# Patient Record
Sex: MALE | Race: WHITE | URBAN - METROPOLITAN AREA
[De-identification: names, ages, dates, MRNs, and addresses within clinical notes are randomized per-mention and may not be internally consistent; named-entity substitution may affect disease eponyms.]

---

## 2017-08-17 ENCOUNTER — INPATIENT (INPATIENT)
Facility: HOSPITAL | Age: 82
LOS: 3 days | Discharge: HOME | End: 2017-08-21
Attending: INTERNAL MEDICINE

## 2017-08-17 DIAGNOSIS — I21.4 NON-ST ELEVATION (NSTEMI) MYOCARDIAL INFARCTION: ICD-10-CM

## 2017-08-17 DIAGNOSIS — R00.1 BRADYCARDIA, UNSPECIFIED: ICD-10-CM

## 2017-08-17 DIAGNOSIS — R55 SYNCOPE AND COLLAPSE: ICD-10-CM

## 2017-08-17 DIAGNOSIS — R07.9 CHEST PAIN, UNSPECIFIED: ICD-10-CM

## 2017-08-17 DIAGNOSIS — N17.9 ACUTE KIDNEY FAILURE, UNSPECIFIED: ICD-10-CM

## 2017-08-23 ENCOUNTER — INPATIENT (INPATIENT)
Facility: HOSPITAL | Age: 82
LOS: 7 days | Discharge: NURSING, REHAB OR LONG-TERM CARE | End: 2017-08-31
Attending: INTERNAL MEDICINE

## 2017-08-23 DIAGNOSIS — R53.81 OTHER MALAISE: ICD-10-CM

## 2017-08-23 DIAGNOSIS — E78.5 HYPERLIPIDEMIA, UNSPECIFIED: ICD-10-CM

## 2017-08-23 DIAGNOSIS — R55 SYNCOPE AND COLLAPSE: ICD-10-CM

## 2017-08-23 DIAGNOSIS — N17.9 ACUTE KIDNEY FAILURE, UNSPECIFIED: ICD-10-CM

## 2017-08-23 DIAGNOSIS — I95.1 ORTHOSTATIC HYPOTENSION: ICD-10-CM

## 2017-08-23 DIAGNOSIS — E11.22 TYPE 2 DIABETES MELLITUS WITH DIABETIC CHRONIC KIDNEY DISEASE: ICD-10-CM

## 2017-08-23 DIAGNOSIS — R07.9 CHEST PAIN, UNSPECIFIED: ICD-10-CM

## 2017-08-23 DIAGNOSIS — I25.10 ATHEROSCLEROTIC HEART DISEASE OF NATIVE CORONARY ARTERY WITHOUT ANGINA PECTORIS: ICD-10-CM

## 2017-08-23 DIAGNOSIS — E83.42 HYPOMAGNESEMIA: ICD-10-CM

## 2017-08-23 DIAGNOSIS — E86.0 DEHYDRATION: ICD-10-CM

## 2017-08-23 DIAGNOSIS — I12.9 HYPERTENSIVE CHRONIC KIDNEY DISEASE WITH STAGE 1 THROUGH STAGE 4 CHRONIC KIDNEY DISEASE, OR UNSPECIFIED CHRONIC KIDNEY DISEASE: ICD-10-CM

## 2017-08-23 DIAGNOSIS — I21.4 NON-ST ELEVATION (NSTEMI) MYOCARDIAL INFARCTION: ICD-10-CM

## 2017-08-23 DIAGNOSIS — N18.3 CHRONIC KIDNEY DISEASE, STAGE 3 (MODERATE): ICD-10-CM

## 2017-08-23 DIAGNOSIS — R10.13 EPIGASTRIC PAIN: ICD-10-CM

## 2017-08-23 DIAGNOSIS — Z95.5 PRESENCE OF CORONARY ANGIOPLASTY IMPLANT AND GRAFT: ICD-10-CM

## 2017-08-23 DIAGNOSIS — J06.9 ACUTE UPPER RESPIRATORY INFECTION, UNSPECIFIED: ICD-10-CM

## 2017-08-23 DIAGNOSIS — C90.01 MULTIPLE MYELOMA IN REMISSION: ICD-10-CM

## 2017-08-23 DIAGNOSIS — R00.1 BRADYCARDIA, UNSPECIFIED: ICD-10-CM

## 2017-08-23 DIAGNOSIS — Z79.4 LONG TERM (CURRENT) USE OF INSULIN: ICD-10-CM

## 2017-08-28 DIAGNOSIS — C90.00 MULTIPLE MYELOMA NOT HAVING ACHIEVED REMISSION: ICD-10-CM

## 2017-08-28 DIAGNOSIS — R42 DIZZINESS AND GIDDINESS: ICD-10-CM

## 2017-09-01 ENCOUNTER — OUTPATIENT (OUTPATIENT)
Dept: OUTPATIENT SERVICES | Facility: HOSPITAL | Age: 82
LOS: 1 days | Discharge: HOME | End: 2017-09-01

## 2017-09-01 DIAGNOSIS — R79.9 ABNORMAL FINDING OF BLOOD CHEMISTRY, UNSPECIFIED: ICD-10-CM

## 2017-09-01 DIAGNOSIS — Z51.5 ENCOUNTER FOR PALLIATIVE CARE: ICD-10-CM

## 2017-09-01 DIAGNOSIS — I21.4 NON-ST ELEVATION (NSTEMI) MYOCARDIAL INFARCTION: ICD-10-CM

## 2017-09-01 DIAGNOSIS — R00.1 BRADYCARDIA, UNSPECIFIED: ICD-10-CM

## 2017-09-01 DIAGNOSIS — D64.9 ANEMIA, UNSPECIFIED: ICD-10-CM

## 2017-09-01 DIAGNOSIS — E11.22 TYPE 2 DIABETES MELLITUS WITH DIABETIC CHRONIC KIDNEY DISEASE: ICD-10-CM

## 2017-09-01 DIAGNOSIS — R55 SYNCOPE AND COLLAPSE: ICD-10-CM

## 2017-09-01 DIAGNOSIS — R07.9 CHEST PAIN, UNSPECIFIED: ICD-10-CM

## 2017-09-01 DIAGNOSIS — N17.9 ACUTE KIDNEY FAILURE, UNSPECIFIED: ICD-10-CM

## 2017-09-01 DIAGNOSIS — I25.10 ATHEROSCLEROTIC HEART DISEASE OF NATIVE CORONARY ARTERY WITHOUT ANGINA PECTORIS: ICD-10-CM

## 2017-09-01 DIAGNOSIS — N18.9 CHRONIC KIDNEY DISEASE, UNSPECIFIED: ICD-10-CM

## 2017-09-01 DIAGNOSIS — Z86.73 PERSONAL HISTORY OF TRANSIENT ISCHEMIC ATTACK (TIA), AND CEREBRAL INFARCTION WITHOUT RESIDUAL DEFICITS: ICD-10-CM

## 2017-09-01 DIAGNOSIS — E78.5 HYPERLIPIDEMIA, UNSPECIFIED: ICD-10-CM

## 2017-09-01 DIAGNOSIS — M10.9 GOUT, UNSPECIFIED: ICD-10-CM

## 2017-09-01 DIAGNOSIS — Z79.01 LONG TERM (CURRENT) USE OF ANTICOAGULANTS: ICD-10-CM

## 2017-09-01 DIAGNOSIS — C90.00 MULTIPLE MYELOMA NOT HAVING ACHIEVED REMISSION: ICD-10-CM

## 2017-09-01 DIAGNOSIS — T44.7X5A ADVERSE EFFECT OF BETA-ADRENORECEPTOR ANTAGONISTS, INITIAL ENCOUNTER: ICD-10-CM

## 2017-09-01 DIAGNOSIS — Z79.84 LONG TERM (CURRENT) USE OF ORAL HYPOGLYCEMIC DRUGS: ICD-10-CM

## 2017-09-01 DIAGNOSIS — I12.9 HYPERTENSIVE CHRONIC KIDNEY DISEASE WITH STAGE 1 THROUGH STAGE 4 CHRONIC KIDNEY DISEASE, OR UNSPECIFIED CHRONIC KIDNEY DISEASE: ICD-10-CM

## 2017-09-01 DIAGNOSIS — E21.3 HYPERPARATHYROIDISM, UNSPECIFIED: ICD-10-CM

## 2017-09-05 ENCOUNTER — OUTPATIENT (OUTPATIENT)
Dept: OUTPATIENT SERVICES | Facility: HOSPITAL | Age: 82
LOS: 1 days | Discharge: HOME | End: 2017-09-05

## 2017-09-05 DIAGNOSIS — I21.4 NON-ST ELEVATION (NSTEMI) MYOCARDIAL INFARCTION: ICD-10-CM

## 2017-09-05 DIAGNOSIS — N17.9 ACUTE KIDNEY FAILURE, UNSPECIFIED: ICD-10-CM

## 2017-09-05 DIAGNOSIS — R55 SYNCOPE AND COLLAPSE: ICD-10-CM

## 2017-09-05 DIAGNOSIS — R07.9 CHEST PAIN, UNSPECIFIED: ICD-10-CM

## 2017-09-05 DIAGNOSIS — R79.9 ABNORMAL FINDING OF BLOOD CHEMISTRY, UNSPECIFIED: ICD-10-CM

## 2017-09-05 DIAGNOSIS — R00.1 BRADYCARDIA, UNSPECIFIED: ICD-10-CM

## 2017-09-11 ENCOUNTER — OUTPATIENT (OUTPATIENT)
Dept: OUTPATIENT SERVICES | Facility: HOSPITAL | Age: 82
LOS: 1 days | Discharge: HOME | End: 2017-09-11

## 2017-09-11 DIAGNOSIS — N17.9 ACUTE KIDNEY FAILURE, UNSPECIFIED: ICD-10-CM

## 2017-09-11 DIAGNOSIS — R79.9 ABNORMAL FINDING OF BLOOD CHEMISTRY, UNSPECIFIED: ICD-10-CM

## 2017-09-11 DIAGNOSIS — R07.9 CHEST PAIN, UNSPECIFIED: ICD-10-CM

## 2017-09-11 DIAGNOSIS — R00.1 BRADYCARDIA, UNSPECIFIED: ICD-10-CM

## 2017-09-11 DIAGNOSIS — I21.4 NON-ST ELEVATION (NSTEMI) MYOCARDIAL INFARCTION: ICD-10-CM

## 2017-09-11 DIAGNOSIS — R55 SYNCOPE AND COLLAPSE: ICD-10-CM

## 2017-09-12 ENCOUNTER — INPATIENT (INPATIENT)
Facility: HOSPITAL | Age: 82
LOS: 2 days | Discharge: HOME | End: 2017-09-15
Attending: INTERNAL MEDICINE | Admitting: INTERNAL MEDICINE

## 2017-09-12 DIAGNOSIS — R55 SYNCOPE AND COLLAPSE: ICD-10-CM

## 2017-09-12 DIAGNOSIS — R07.9 CHEST PAIN, UNSPECIFIED: ICD-10-CM

## 2017-09-12 DIAGNOSIS — N17.9 ACUTE KIDNEY FAILURE, UNSPECIFIED: ICD-10-CM

## 2017-09-12 DIAGNOSIS — I21.4 NON-ST ELEVATION (NSTEMI) MYOCARDIAL INFARCTION: ICD-10-CM

## 2017-09-12 DIAGNOSIS — R00.1 BRADYCARDIA, UNSPECIFIED: ICD-10-CM

## 2017-09-14 DIAGNOSIS — Z02.9 ENCOUNTER FOR ADMINISTRATIVE EXAMINATIONS, UNSPECIFIED: ICD-10-CM

## 2017-09-18 DIAGNOSIS — Z95.828 PRESENCE OF OTHER VASCULAR IMPLANTS AND GRAFTS: ICD-10-CM

## 2017-09-18 DIAGNOSIS — I12.9 HYPERTENSIVE CHRONIC KIDNEY DISEASE WITH STAGE 1 THROUGH STAGE 4 CHRONIC KIDNEY DISEASE, OR UNSPECIFIED CHRONIC KIDNEY DISEASE: ICD-10-CM

## 2017-09-18 DIAGNOSIS — M10.061 IDIOPATHIC GOUT, RIGHT KNEE: ICD-10-CM

## 2017-09-18 DIAGNOSIS — C90.00 MULTIPLE MYELOMA NOT HAVING ACHIEVED REMISSION: ICD-10-CM

## 2017-09-18 DIAGNOSIS — Z86.73 PERSONAL HISTORY OF TRANSIENT ISCHEMIC ATTACK (TIA), AND CEREBRAL INFARCTION WITHOUT RESIDUAL DEFICITS: ICD-10-CM

## 2017-09-18 DIAGNOSIS — M10.062 IDIOPATHIC GOUT, LEFT KNEE: ICD-10-CM

## 2017-09-18 DIAGNOSIS — E78.5 HYPERLIPIDEMIA, UNSPECIFIED: ICD-10-CM

## 2017-09-18 DIAGNOSIS — R55 SYNCOPE AND COLLAPSE: ICD-10-CM

## 2017-09-18 DIAGNOSIS — Z79.4 LONG TERM (CURRENT) USE OF INSULIN: ICD-10-CM

## 2017-09-18 DIAGNOSIS — N18.3 CHRONIC KIDNEY DISEASE, STAGE 3 (MODERATE): ICD-10-CM

## 2017-09-18 DIAGNOSIS — R31.29 OTHER MICROSCOPIC HEMATURIA: ICD-10-CM

## 2017-09-18 DIAGNOSIS — E11.22 TYPE 2 DIABETES MELLITUS WITH DIABETIC CHRONIC KIDNEY DISEASE: ICD-10-CM

## 2017-09-18 DIAGNOSIS — I25.10 ATHEROSCLEROTIC HEART DISEASE OF NATIVE CORONARY ARTERY WITHOUT ANGINA PECTORIS: ICD-10-CM

## 2017-09-18 DIAGNOSIS — M17.0 BILATERAL PRIMARY OSTEOARTHRITIS OF KNEE: ICD-10-CM

## 2017-12-15 ENCOUNTER — APPOINTMENT (OUTPATIENT)
Dept: CARDIOLOGY | Facility: CLINIC | Age: 82
End: 2017-12-15

## 2018-01-22 ENCOUNTER — APPOINTMENT (OUTPATIENT)
Dept: CARDIOLOGY | Facility: CLINIC | Age: 83
End: 2018-01-22

## 2018-01-22 VITALS — DIASTOLIC BLOOD PRESSURE: 78 MMHG | SYSTOLIC BLOOD PRESSURE: 120 MMHG | HEART RATE: 76 BPM

## 2018-01-22 VITALS — BODY MASS INDEX: 38.68 KG/M2 | HEIGHT: 60 IN | WEIGHT: 197 LBS

## 2018-01-22 DIAGNOSIS — Z87.891 PERSONAL HISTORY OF NICOTINE DEPENDENCE: ICD-10-CM

## 2018-01-22 DIAGNOSIS — Z86.79 PERSONAL HISTORY OF OTHER DISEASES OF THE CIRCULATORY SYSTEM: ICD-10-CM

## 2018-02-16 ENCOUNTER — OUTPATIENT (OUTPATIENT)
Dept: OUTPATIENT SERVICES | Facility: HOSPITAL | Age: 83
LOS: 1 days | Discharge: HOME | End: 2018-02-16

## 2018-02-16 DIAGNOSIS — Z01.21 ENCOUNTER FOR DENTAL EXAMINATION AND CLEANING WITH ABNORMAL FINDINGS: ICD-10-CM

## 2020-03-02 ENCOUNTER — TRANSCRIPTION ENCOUNTER (OUTPATIENT)
Age: 85
End: 2020-03-02

## 2020-08-17 ENCOUNTER — OUTPATIENT (OUTPATIENT)
Dept: OUTPATIENT SERVICES | Facility: HOSPITAL | Age: 85
LOS: 1 days | Discharge: HOME | End: 2020-08-17

## 2020-08-18 DIAGNOSIS — K08.109 COMPLETE LOSS OF TEETH, UNSPECIFIED CAUSE, UNSPECIFIED CLASS: ICD-10-CM

## 2020-08-25 ENCOUNTER — EMERGENCY (EMERGENCY)
Facility: HOSPITAL | Age: 85
LOS: 0 days | Discharge: HOME | End: 2020-08-25
Attending: EMERGENCY MEDICINE | Admitting: EMERGENCY MEDICINE
Payer: MEDICARE

## 2020-08-25 VITALS
OXYGEN SATURATION: 99 % | DIASTOLIC BLOOD PRESSURE: 88 MMHG | TEMPERATURE: 98 F | HEART RATE: 80 BPM | SYSTOLIC BLOOD PRESSURE: 218 MMHG | RESPIRATION RATE: 18 BRPM

## 2020-08-25 VITALS — SYSTOLIC BLOOD PRESSURE: 138 MMHG | HEART RATE: 63 BPM | DIASTOLIC BLOOD PRESSURE: 63 MMHG

## 2020-08-25 DIAGNOSIS — R04.0 EPISTAXIS: ICD-10-CM

## 2020-08-25 LAB
ALBUMIN SERPL ELPH-MCNC: 4.1 G/DL — SIGNIFICANT CHANGE UP (ref 3.5–5.2)
ALP SERPL-CCNC: 89 U/L — SIGNIFICANT CHANGE UP (ref 30–115)
ALT FLD-CCNC: 29 U/L — SIGNIFICANT CHANGE UP (ref 0–41)
ANION GAP SERPL CALC-SCNC: 9 MMOL/L — SIGNIFICANT CHANGE UP (ref 7–14)
APTT BLD: 24.3 SEC — LOW (ref 27–39.2)
AST SERPL-CCNC: 57 U/L — HIGH (ref 0–41)
BASE EXCESS BLDV CALC-SCNC: 1.8 MMOL/L — SIGNIFICANT CHANGE UP (ref -2–2)
BASOPHILS # BLD AUTO: 0.05 K/UL — SIGNIFICANT CHANGE UP (ref 0–0.2)
BASOPHILS NFR BLD AUTO: 0.8 % — SIGNIFICANT CHANGE UP (ref 0–1)
BILIRUB SERPL-MCNC: 0.5 MG/DL — SIGNIFICANT CHANGE UP (ref 0.2–1.2)
BLD GP AB SCN SERPL QL: SIGNIFICANT CHANGE UP
BUN SERPL-MCNC: 26 MG/DL — HIGH (ref 10–20)
CA-I SERPL-SCNC: 1.13 MMOL/L — SIGNIFICANT CHANGE UP (ref 1.12–1.3)
CALCIUM SERPL-MCNC: 9.4 MG/DL — SIGNIFICANT CHANGE UP (ref 8.5–10.1)
CHLORIDE SERPL-SCNC: 105 MMOL/L — SIGNIFICANT CHANGE UP (ref 98–110)
CO2 SERPL-SCNC: 22 MMOL/L — SIGNIFICANT CHANGE UP (ref 17–32)
CREAT SERPL-MCNC: 1.5 MG/DL — SIGNIFICANT CHANGE UP (ref 0.7–1.5)
EOSINOPHIL # BLD AUTO: 0.44 K/UL — SIGNIFICANT CHANGE UP (ref 0–0.7)
EOSINOPHIL NFR BLD AUTO: 6.9 % — SIGNIFICANT CHANGE UP (ref 0–8)
GAS PNL BLDV: 131 MMOL/L — LOW (ref 136–145)
GAS PNL BLDV: SIGNIFICANT CHANGE UP
GLUCOSE SERPL-MCNC: 206 MG/DL — HIGH (ref 70–99)
HCO3 BLDV-SCNC: 26 MMOL/L — SIGNIFICANT CHANGE UP (ref 22–29)
HCT VFR BLD CALC: 39.8 % — LOW (ref 42–52)
HCT VFR BLDA CALC: 34.5 % — SIGNIFICANT CHANGE UP (ref 34–44)
HGB BLD CALC-MCNC: 11.2 G/DL — LOW (ref 14–18)
HGB BLD-MCNC: 13.4 G/DL — LOW (ref 14–18)
IMM GRANULOCYTES NFR BLD AUTO: 0.2 % — SIGNIFICANT CHANGE UP (ref 0.1–0.3)
INR BLD: 1.02 RATIO — SIGNIFICANT CHANGE UP (ref 0.65–1.3)
LACTATE BLDV-MCNC: 1.5 MMOL/L — SIGNIFICANT CHANGE UP (ref 0.5–1.6)
LDH SERPL L TO P-CCNC: 364 U/L — HIGH (ref 50–242)
LYMPHOCYTES # BLD AUTO: 2.24 K/UL — SIGNIFICANT CHANGE UP (ref 1.2–3.4)
LYMPHOCYTES # BLD AUTO: 35.3 % — SIGNIFICANT CHANGE UP (ref 20.5–51.1)
MCHC RBC-ENTMCNC: 32.4 PG — HIGH (ref 27–31)
MCHC RBC-ENTMCNC: 33.7 G/DL — SIGNIFICANT CHANGE UP (ref 32–37)
MCV RBC AUTO: 96.4 FL — HIGH (ref 80–94)
MONOCYTES # BLD AUTO: 0.46 K/UL — SIGNIFICANT CHANGE UP (ref 0.1–0.6)
MONOCYTES NFR BLD AUTO: 7.3 % — SIGNIFICANT CHANGE UP (ref 1.7–9.3)
NEUTROPHILS # BLD AUTO: 3.14 K/UL — SIGNIFICANT CHANGE UP (ref 1.4–6.5)
NEUTROPHILS NFR BLD AUTO: 49.5 % — SIGNIFICANT CHANGE UP (ref 42.2–75.2)
NRBC # BLD: 0 /100 WBCS — SIGNIFICANT CHANGE UP (ref 0–0)
PCO2 BLDV: 40 MMHG — LOW (ref 41–51)
PH BLDV: 7.42 — SIGNIFICANT CHANGE UP (ref 7.26–7.43)
PLATELET # BLD AUTO: 130 K/UL — SIGNIFICANT CHANGE UP (ref 130–400)
PO2 BLDV: 48 MMHG — HIGH (ref 20–40)
POTASSIUM BLDV-SCNC: 5.7 MMOL/L — HIGH (ref 3.3–5.6)
POTASSIUM SERPL-MCNC: 6.4 MMOL/L — CRITICAL HIGH (ref 3.5–5)
POTASSIUM SERPL-SCNC: 6.4 MMOL/L — CRITICAL HIGH (ref 3.5–5)
PROT SERPL-MCNC: 6.5 G/DL — SIGNIFICANT CHANGE UP (ref 6–8)
PROTHROM AB SERPL-ACNC: 11.7 SEC — SIGNIFICANT CHANGE UP (ref 9.95–12.87)
RBC # BLD: 4.13 M/UL — LOW (ref 4.7–6.1)
RBC # FLD: 12.9 % — SIGNIFICANT CHANGE UP (ref 11.5–14.5)
SAO2 % BLDV: 84 % — SIGNIFICANT CHANGE UP
SODIUM SERPL-SCNC: 136 MMOL/L — SIGNIFICANT CHANGE UP (ref 135–146)
WBC # BLD: 6.34 K/UL — SIGNIFICANT CHANGE UP (ref 4.8–10.8)
WBC # FLD AUTO: 6.34 K/UL — SIGNIFICANT CHANGE UP (ref 4.8–10.8)

## 2020-08-25 PROCEDURE — 99284 EMERGENCY DEPT VISIT MOD MDM: CPT

## 2020-08-25 NOTE — ED PROVIDER NOTE - PROVIDER TOKENS
FREE:[LAST:[YOUR HEMATOLOGIST/ONCOLOGIST],PHONE:[(   )    -],FAX:[(   )    -],ADDRESS:[DR. PEMBERTON]]

## 2020-08-25 NOTE — ED PROVIDER NOTE - NSFOLLOWUPCLINICS_GEN_ALL_ED_FT
[facility]  ENT  [address], [address] [place] [address]  Phone: [phone]  Fax:   Follow Up Time: 1-3 Days

## 2020-08-25 NOTE — ED PROVIDER NOTE - NS ED ROS FT
CONST: No fever, chills or bodyaches  EYES: No pain, redness, drainage or visual changes.  ENT: (+) nosebleed. No ear pain or discharge, nasal discharge or congestion. No sore throat  CARD: No chest pain, palpitations  RESP: No SOB, cough, hemoptysis. No hx of asthma or COPD  GI: No abdominal pain, N/V/D  : No urinary symptoms  MS: No joint pain, back pain or extremity pain/injury  SKIN: No rashes  NEURO: No headache, dizziness, paresthesias or LOC

## 2020-08-25 NOTE — ED PROVIDER NOTE - ATTENDING CONTRIBUTION TO CARE
89 yo M pmh of MM in remission as per 2 years ago, HTN, HLD, previous TIA on plavix presents with epistaxis. States that this morning he bent over and then started to having bleeding from his right nare. Held pressure for a while but was still having bleeding so called ems. Patient denies any dizziness, headache, chest pain, no shortness of breath, no palpitations. oncologist is Dr. Daniel.     CONSTITUTIONAL: Well-developed; well-nourished; in no acute distress.   SKIN: warm, dry  HEAD: Normocephalic; atraumatic.  EYES: PERRL, EOMI, no conjunctival erythema  ENT: + epistaxis, oozing from the right nare. unable to visualize origin of bleeding. no active bleeding noted in posterior pharynx. Protecting airway and secretions.   NECK: Supple; non tender.  CARD: S1, S2 normal;  Regular rate and rhythm.   RESP: No wheezes, rales or rhonchi.  ABD: soft non tender, non distended, no rebound or guarding  EXT: Normal ROM.  5/5 strength in all 4 extremities   LYMPH: No acute cervical adenopathy.  NEURO: Alert, oriented, grossly unremarkable. neurovascularly intact  PSYCH: Cooperative, appropriate.

## 2020-08-25 NOTE — ED PROVIDER NOTE - PROGRESS NOTE DETAILS
close pin placed on nose to apply pressure, will reassess spoke with heme/onc dr. chakraborty, since dr. easton is on vacation. dr. chakraborty suggest control bleeding and order basic blood work including ldh to determine if serum viscosity is elevated which could suggest nose bleed secondary to multiple myeloma reoccurrence. no active nose bleed. spoke with dr. chakraborty regarding elevated ldh, states as long as nose bleed has stopped, can be discharged home and will be worked up out pt. [facility] results [facility] pt, and conversation with him. advised of return precaution such as recurrent nosebleed, bruising, blood in the urine, blood in the stool, dizziness, or syncope. agreeable to dc. I was directly involved in the care of this patient. PA Fellow Fatoumata note/plan reviewed and agreed.

## 2020-08-25 NOTE — ED ADULT NURSE NOTE - OBJECTIVE STATEMENT
BIBEMS for intractable nosebleed starting 10AM today. Daughter stated, "he was gardening and all of a sudden his nose started pouring out". Bleeding still noted on arrival, dressing applied by EMS. Denies falling and head trauma.

## 2020-08-25 NOTE — ED PROVIDER NOTE - OBJECTIVE STATEMENT
89 y/o male with a PMH of multiple myeloma in remission (follows heme/onc Dr. Easton, appt 08/2020), and HTN on aspirin and Plavix presents to the ED for evaluation of nosebleed that began around 9:45AM today after bending forward to remove weeds from his garden. pt daughter reports to applying pressure and ice packs with no cessation of bleeding. pt daughter called dr. easton office. pt denies easy bruising, blood in the urine, blood in the stool, hx of nosebleeds, sob, chest pain, recent fall, or recent trauma. 89 y/o male with a PMH of multiple myeloma in remission (follows heme/onc Dr. Colmenares, appt 08/2020), and HTN on aspirin and Plavix presents to the ED for evaluation of nosebleed that began around 9:45AM today after bending forward to remove weeds from his garden. pt daughter reports to applying pressure and ice packs with no cessation of bleeding. pt daughter called dr. Colmenares office. pt denies easy bruising, blood in the urine, blood in the stool, hx of nosebleeds, sob, chest pain, recent fall, or recent trauma.

## 2020-08-25 NOTE — ED PROVIDER NOTE - PHYSICAL EXAMINATION
Physical Exam    Vital Signs: I have reviewed the initial vital signs.  Constitutional: well-nourished, appears stated age, no acute distress  Eyes: Conjunctiva pink, Sclera clear  ENT: Oropharynx is clear with lesions. uvula midline. no tonsillar erythema, edema, or exudates. no stridor. no pta. no active bleeding into the posterior oropharynx. blood oozing with clot in the right nostril only.   Cardiovascular: S1 and S2, regular rate, regular rhythm, well-perfused extremities, radial pulses equal and 2+ b/l.   Respiratory: unlabored respiratory effort, clear to auscultation bilaterally no wheezing, rales and rhonchi. pt is speaking full sentences. no accessory muscle use.   Gastrointestinal: soft, non-tender, nondistended abdomen, no pulsatile mass, normal bowl sounds, no rebound, no guarding  Integumentary: warm, dry, no rash. no ecchymosis.   Neurologic: awake, alert

## 2020-08-25 NOTE — ED PROVIDER NOTE - CLINICAL SUMMARY MEDICAL DECISION MAKING FREE TEXT BOX
Patient presents with epistaxis for the last few hours. Bleeding controlled in the ED with pressure. Discussed with Dr. Huizar from oncology who recommends blood work to make sure this is not related to MM. labs done. LDH elevated. Dr. Huizar aware who states that patient can be see as an outpatient. Results discussed with patient and family. Understands to follow up with Dr. Huizar. Patient to be discharged from ED. Any available test results were discussed with patient and/or family. Verbal instructions given, including instructions to return to ED immediately for any new, worsening, or concerning symptoms. Patient endorsed understanding. Written discharge instructions additionally given, including follow-up plan.

## 2020-08-25 NOTE — ED PROVIDER NOTE - NSFOLLOWUPINSTRUCTIONS_ED_ALL_ED_FT
Nosebleed, Adult  A nosebleed is when blood comes out of the nose. Nosebleeds are common. Usually, they are not a sign of a serious condition.    Nosebleeds can happen if a small blood vessel in your nose starts to bleed or if the lining of your nose (mucous membrane) cracks. They are commonly caused by:    Allergies.  Colds.  Picking your nose.  Blowing your nose too hard.  An injury from sticking an object into your nose or getting hit in the nose.  Dry or cold air.    Less common causes of nosebleeds include:    Toxic fumes.  Something abnormal in the nose or in the air-filled spaces in the bones of the face (sinuses).  Growths in the nose, such as polyps.  Medicines or conditions that cause blood to clot slowly.  Certain illnesses or procedures that irritate or dry out the nasal passages.    Follow these instructions at home:  When you have a nosebleed:     Sit down and tilt your head slightly forward.  Use a clean towel or tissue to pinch your nostrils under the bony part of your nose. After 10 minutes, let go of your nose and see if bleeding starts again. Do not release pressure before that time. If there is still bleeding, repeat the pinching and holding for 10 minutes until the bleeding stops.  Do not place tissues or gauze in the nose to stop bleeding.  Avoid lying down and avoid tilting your head backward. That may make blood collect in the throat and cause gagging or coughing.  Use a nasal spray decongestant to help with a nosebleed as told by your health care provider.  Image Do not use petroleum jelly or mineral oil in your nose. It can drip into your lungs.  After a nosebleed:     Avoid blowing your nose or sniffing for a number of hours.  Avoid straining, lifting, or bending at the waist for several days. You may resume other normal activities as you are able.  Use saline spray or a humidifier as told by your health care provider.  Aspirin and blood thinners make bleeding more likely. If you are prescribed these medicines and you suffer from nosebleeds:    Ask your health care provider if you should stop taking the medicines or if you should adjust the dose.  Do not stop taking medicines that your health care provider has recommended unless told by your health care provider.    If your nosebleed was caused by dry mucous membranes, use over-the-counter saline nasal spray or gel. This will keep the mucous membranes moist and allow them to heal. If you must use a lubricant:    Choose one that is water-soluble.  Use only as much as you need and use it only as often as needed.  Do not lie down until several hours after you use it.    Contact a health care provider if:  You have a fever.  You get nosebleeds often or more often than usual.  You bruise very easily.  You have a nosebleed from having something stuck in your nose.  You have bleeding in your mouth.  You vomit or cough up brown material.  You have a nosebleed after you start a new medicine.  Get help right away if:  You have a nosebleed after a fall or a head injury.  Your nosebleed does not go away after 20 minutes.  You feel dizzy or weak.  You have unusual bleeding from other parts of your body.  You have unusual bruising on other parts of your body.  You become sweaty.  You vomit blood.

## 2020-08-25 NOTE — ED PROVIDER NOTE - PATIENT PORTAL LINK FT
You can access the FollowMyHealth Patient Portal offered by [facility] by registering at the following website: [email or web address]. By joining [employer]’s FollowMyHealth portal, you will also be able to view your health information using other applications (apps) compatible with our system.

## 2020-08-25 NOTE — ED ADULT NURSE NOTE - NSIMPLEMENTINTERV_GEN_ALL_ED
Implemented All Fall with Harm Risk Interventions:  Isabella to call system. Call bell, personal items and telephone within reach. Instruct patient to call for assistance. Room bathroom lighting operational. Non-slip footwear when patient is off stretcher. Physically safe environment: no spills, clutter or unnecessary equipment. Stretcher in lowest position, wheels locked, appropriate side rails in place. Provide visual cue, wrist band, yellow gown, etc. Monitor gait and stability. Monitor for mental status changes and reorient to person, place, and time. Review medications for side effects contributing to fall risk. Reinforce activity limits and safety measures with patient and family. Provide visual clues: red socks.

## 2020-09-17 ENCOUNTER — APPOINTMENT (OUTPATIENT)
Dept: CARDIOLOGY | Facility: CLINIC | Age: 85
End: 2020-09-17
Payer: MEDICARE

## 2020-09-17 VITALS
BODY MASS INDEX: 29.89 KG/M2 | HEIGHT: 66 IN | TEMPERATURE: 96.3 F | HEART RATE: 64 BPM | WEIGHT: 186 LBS | SYSTOLIC BLOOD PRESSURE: 118 MMHG | DIASTOLIC BLOOD PRESSURE: 72 MMHG | OXYGEN SATURATION: 97 % | RESPIRATION RATE: 16 BRPM

## 2020-09-17 VITALS — HEART RATE: 76 BPM | DIASTOLIC BLOOD PRESSURE: 58 MMHG | SYSTOLIC BLOOD PRESSURE: 118 MMHG

## 2020-09-17 VITALS — DIASTOLIC BLOOD PRESSURE: 56 MMHG | SYSTOLIC BLOOD PRESSURE: 114 MMHG

## 2020-09-17 DIAGNOSIS — M10.9 GOUT, UNSPECIFIED: ICD-10-CM

## 2020-09-17 DIAGNOSIS — Z82.3 FAMILY HISTORY OF STROKE: ICD-10-CM

## 2020-09-17 DIAGNOSIS — Z82.0 FAMILY HISTORY OF EPILEPSY AND OTHER DISEASES OF THE NERVOUS SYSTEM: ICD-10-CM

## 2020-09-17 PROCEDURE — 99204 OFFICE O/P NEW MOD 45 MIN: CPT

## 2020-09-17 PROCEDURE — 93000 ELECTROCARDIOGRAM COMPLETE: CPT

## 2020-09-17 RX ORDER — TRAMADOL HYDROCHLORIDE 50 MG/1
50 TABLET, COATED ORAL
Refills: 0 | Status: DISCONTINUED | COMMUNITY
End: 2020-09-17

## 2020-09-17 RX ORDER — CHOLECALCIFEROL (VITAMIN D3) 1250 MCG
1.25 MG CAPSULE ORAL
Refills: 0 | Status: DISCONTINUED | COMMUNITY
End: 2020-09-17

## 2020-09-17 NOTE — ASSESSMENT
[FreeTextEntry1] : HTN - controlled\par High cholesterol - controlled\par DM - stable\par TIAs - stable\par MI - stable\par Dizziness - stable\par Multiple Myeloma - stable\par Left carotid endarterectomy - 2010 - stable\par Gout - stable\par

## 2020-09-17 NOTE — PHYSICAL EXAM
[General Appearance - Well Developed] : well developed [Normal Appearance] : normal appearance [Well Groomed] : well groomed [General Appearance - Well Nourished] : well nourished [No Deformities] : no deformities [General Appearance - In No Acute Distress] : no acute distress [Normal Conjunctiva] : the conjunctiva exhibited no abnormalities [Eyelids - No Xanthelasma] : the eyelids demonstrated no xanthelasmas [Normal Oral Mucosa] : normal oral mucosa [No Oral Pallor] : no oral pallor [No Oral Cyanosis] : no oral cyanosis [Normal Jugular Venous A Waves Present] : normal jugular venous A waves present [Normal Jugular Venous V Waves Present] : normal jugular venous V waves present [No Jugular Venous Saavedra A Waves] : no jugular venous saavedra A waves [5th Left ICS - MCL] : palpated at the 5th LICS in the midclavicular line [Normal Rate] : normal [Normal S1] : normal S1 [Normal S2] : normal S2 [No Murmur] : no murmurs heard [2+] : left 2+ [No Abnormalities] : the abdominal aorta was not enlarged and no bruit was heard [No Pitting Edema] : no pitting edema present [Respiration, Rhythm And Depth] : normal respiratory rhythm and effort [Exaggerated Use Of Accessory Muscles For Inspiration] : no accessory muscle use [Auscultation Breath Sounds / Voice Sounds] : lungs were clear to auscultation bilaterally [Abdomen Soft] : soft [Abdomen Tenderness] : non-tender [Abdomen Mass (___ Cm)] : no abdominal mass palpated [Abnormal Walk] : normal gait [Gait - Sufficient For Exercise Testing] : the gait was sufficient for exercise testing [Nail Clubbing] : no clubbing of the fingernails [Cyanosis, Localized] : no localized cyanosis [Petechial Hemorrhages (___cm)] : no petechial hemorrhages [Skin Color & Pigmentation] : normal skin color and pigmentation [] : no rash [No Venous Stasis] : no venous stasis [Skin Lesions] : no skin lesions [No Skin Ulcers] : no skin ulcer [No Xanthoma] : no  xanthoma was observed [Oriented To Time, Place, And Person] : oriented to person, place, and time [Affect] : the affect was normal [Mood] : the mood was normal [No Anxiety] : not feeling anxious [FreeTextEntry1] : Mallampati score  3 [S3] : no S3 [S4] : no S4 [Right Carotid Bruit] : no bruit heard over the right carotid [Left Carotid Bruit] : no bruit heard over the left carotid [Right Femoral Bruit] : no bruit heard over the right femoral artery [Left Femoral Bruit] : no bruit heard over the left femoral artery

## 2020-09-17 NOTE — HISTORY OF PRESENT ILLNESS
[FreeTextEntry1] : 89 year old male came in for evaluation of HTN, high cholesterol and dizziness.  He has been intermittently dizzy for a few months.  The patient describes the room spinning counter clockwise.  He had a bleeding nose with a BP of 228 a few weeks ago.  He has had high BP for 30 years.  He has high cholesterol for 30 years.  He has DM.  The patient denies chest pains, shortness of breath, palpitations, loss of consciousness, or swelling of the ankles.  He had a MI 7 years ago.   He had a temp PPM 3 years ago and had bradycardia due to medication.  He has multiple myeloma.  He has had TIAS over the last several years.\par \par The patient smoked 1 ppd for 16 years and quit 53 years ago.  He never vaped.  The patient denies substance abuse.  The patient sometimes snore.  He has no difficulty sleeping.  He does not doze off during the day.\par \par PMHX:\par \par HTN - controlled\par High cholesterol - controlled\par DM - stable\par TIAs - stable\par MI - stable\par Dizziness - stable\par Multiple Myeloma - stable\par Left carotid endarterectomy - 2010 - stable\par Gout - stable\par \par

## 2020-09-17 NOTE — REASON FOR VISIT
[Initial Evaluation] : an initial evaluation of [Carotid Artery Stenosis] : carotid stenosis [Coronary Artery Disease] : coronary artery disease [Dizziness] : dizziness [Hyperlipidemia] : hyperlipidemia [Hypertension] : hypertension

## 2020-09-17 NOTE — DISCUSSION/SUMMARY
[___ Month(s)] : [unfilled] month(s) [With Me] : with me [FreeTextEntry1] : Ekg results were reviewed.\par \par Fasting CBC, BMP, LFTs, Lipid Profile, HgbA1c, CRP, UA, Urine Microalbumin, Mg, Ca, TSH, T3, T4 prior to next visit\par Echocardiogram - dizziness, HTN\par Holter monitor - 14 days - dizziness\par Carotid artery duplex - dizziness, h/o TIA\par Abdominal aortic duplex - HTN\par PVR - h/o DM, HTN, hyperlipidemia\par ENT evaluation\par Neuro evaluation\par Monitor home BP\par f/u 1 month

## 2020-09-21 ENCOUNTER — APPOINTMENT (OUTPATIENT)
Dept: CARDIOLOGY | Facility: CLINIC | Age: 85
End: 2020-09-21
Payer: MEDICARE

## 2020-09-21 PROCEDURE — 0296T: CPT

## 2020-09-22 ENCOUNTER — LABORATORY RESULT (OUTPATIENT)
Age: 85
End: 2020-09-22

## 2020-09-22 LAB
BASOPHILS # BLD AUTO: 0.06 K/UL
BASOPHILS NFR BLD AUTO: 0.7 %
EOSINOPHIL # BLD AUTO: 0.62 K/UL
EOSINOPHIL NFR BLD AUTO: 6.8 %
HCT VFR BLD CALC: 40.6 %
HGB BLD-MCNC: 13.3 G/DL
IMM GRANULOCYTES NFR BLD AUTO: 0.2 %
LYMPHOCYTES # BLD AUTO: 4.23 K/UL
LYMPHOCYTES NFR BLD AUTO: 46.6 %
MAN DIFF?: NORMAL
MCHC RBC-ENTMCNC: 32.2 PG
MCHC RBC-ENTMCNC: 32.8 G/DL
MCV RBC AUTO: 98.3 FL
MONOCYTES # BLD AUTO: 0.65 K/UL
MONOCYTES NFR BLD AUTO: 7.2 %
NEUTROPHILS # BLD AUTO: 3.49 K/UL
NEUTROPHILS NFR BLD AUTO: 38.5 %
PLATELET # BLD AUTO: 160 K/UL
RBC # BLD: 4.13 M/UL
RBC # FLD: 13.3 %
WBC # FLD AUTO: 9.07 K/UL

## 2020-09-23 ENCOUNTER — RECORD ABSTRACTING (OUTPATIENT)
Age: 85
End: 2020-09-23

## 2020-09-23 DIAGNOSIS — I34.0 NONRHEUMATIC MITRAL (VALVE) INSUFFICIENCY: ICD-10-CM

## 2020-09-23 DIAGNOSIS — I36.9 NONRHEUMATIC TRICUSPID VALVE DISORDER, UNSPECIFIED: ICD-10-CM

## 2020-09-23 LAB
ALBUMIN SERPL ELPH-MCNC: 4.2 G/DL
ALP BLD-CCNC: 94 U/L
ALT SERPL-CCNC: 38 U/L
ANION GAP SERPL CALC-SCNC: 14 MMOL/L
AST SERPL-CCNC: 41 U/L
BILIRUB DIRECT SERPL-MCNC: <0.2 MG/DL
BILIRUB INDIRECT SERPL-MCNC: >0.3 MG/DL
BILIRUB SERPL-MCNC: 0.5 MG/DL
BUN SERPL-MCNC: 26 MG/DL
CALCIUM SERPL-MCNC: 10.5 MG/DL
CHLORIDE SERPL-SCNC: 104 MMOL/L
CHOLEST SERPL-MCNC: 115 MG/DL
CHOLEST/HDLC SERPL: 2.2 RATIO
CO2 SERPL-SCNC: 22 MMOL/L
CREAT SERPL-MCNC: 1.5 MG/DL
CREAT SPEC-SCNC: 92 MG/DL
CRP SERPL HS-MCNC: 1.19 MG/L
GLUCOSE SERPL-MCNC: 125 MG/DL
HDLC SERPL-MCNC: 53 MG/DL
LDLC SERPL CALC-MCNC: 49 MG/DL
MAGNESIUM SERPL-MCNC: 1.2 MG/DL
MICROALBUMIN 24H UR DL<=1MG/L-MCNC: 26.1 MG/DL
MICROALBUMIN/CREAT 24H UR-RTO: 285 MG/G
POTASSIUM SERPL-SCNC: 4.7 MMOL/L
PROT SERPL-MCNC: 6.5 G/DL
SODIUM SERPL-SCNC: 140 MMOL/L
T3 SERPL-MCNC: 91 NG/DL
T4 SERPL-MCNC: 5.5 UG/DL
TRIGL SERPL-MCNC: 121 MG/DL
TSH SERPL-ACNC: 5.8 UIU/ML

## 2020-10-07 ENCOUNTER — APPOINTMENT (OUTPATIENT)
Dept: CARDIOLOGY | Facility: CLINIC | Age: 85
End: 2020-10-07
Payer: MEDICARE

## 2020-10-07 PROCEDURE — 93880 EXTRACRANIAL BILAT STUDY: CPT

## 2020-10-13 ENCOUNTER — APPOINTMENT (OUTPATIENT)
Dept: CARDIOLOGY | Facility: CLINIC | Age: 85
End: 2020-10-13

## 2020-10-19 ENCOUNTER — APPOINTMENT (OUTPATIENT)
Dept: CARDIOLOGY | Facility: CLINIC | Age: 85
End: 2020-10-19
Payer: MEDICARE

## 2020-10-19 PROCEDURE — 93306 TTE W/DOPPLER COMPLETE: CPT

## 2020-10-21 ENCOUNTER — APPOINTMENT (OUTPATIENT)
Dept: NEUROLOGY | Facility: CLINIC | Age: 85
End: 2020-10-21
Payer: MEDICARE

## 2020-10-21 ENCOUNTER — APPOINTMENT (OUTPATIENT)
Dept: CARDIOLOGY | Facility: CLINIC | Age: 85
End: 2020-10-21
Payer: MEDICARE

## 2020-10-21 VITALS
TEMPERATURE: 97.7 F | WEIGHT: 186 LBS | SYSTOLIC BLOOD PRESSURE: 150 MMHG | BODY MASS INDEX: 29.89 KG/M2 | DIASTOLIC BLOOD PRESSURE: 68 MMHG | OXYGEN SATURATION: 97 % | HEART RATE: 80 BPM | HEIGHT: 66 IN

## 2020-10-21 PROCEDURE — 93923 UPR/LXTR ART STDY 3+ LVLS: CPT

## 2020-10-21 PROCEDURE — 99204 OFFICE O/P NEW MOD 45 MIN: CPT

## 2020-10-21 NOTE — PHYSICAL EXAM
[FreeTextEntry1] : Mental status: Awake, alert and oriented x3.  Recent and remote memory intact.  Naming, repetition and comprehension intact.  Attention/concentration intact.  No dysarthria, no aphasia.  Fund of knowledge appropriate.  \par Cranial nerves: Pupils equally round and reactive to light, visual fields full, no nystagmus, extraocular muscles intact, V1 through V3 intact bilaterally and symmetric, face symmetric, hearing intact to finger rub, palate elevation symmetric, tongue was midline. positive Marcus-Hallpike test shows nystagmus induced in the right side. \par Motor:  MRC grading 5/5 b/l UE/LE.   strength 5/5.  Normal tone and bulk.  No abnormal movements.  \par Sensation: Intact to light touch, proprioception, and pinprick. \par Coordination: No dysmetria on finger-to-nose and heel-to-shin.  No dysdiadokinesia.\par Reflexes: 2+ in bilateral UE/LE, downgoing toes bilaterally. (-) Vazquez.\par Gait: Narrow and steady. No ataxia.  Romberg negative\par \par

## 2020-10-21 NOTE — REVIEW OF SYSTEMS
[Dizziness] : dizziness [Lightheadedness] : lightheadedness [Vertigo] : vertigo [Ataxia] : ataxia [Negative] : Heme/Lymph

## 2020-10-21 NOTE — ASSESSMENT
[FreeTextEntry1] : KUN GLASER is a 89 year old man is being seen as a new patient for episodes of self limiting room spinning sensation with N/V for the past few months. Exam is notable for positive Marcus Hallpike test in the right side. Symptoms and exam are suggestive of peripheral vertigo and possible BPPV. Brain MRI is pending. \par \par - Vestibular rehab \par - Epley maneuvers \par - Will discuss about medication after MRI \par - RTC in 2 months

## 2020-10-21 NOTE — HISTORY OF PRESENT ILLNESS
[FreeTextEntry1] : KUN GLASER is a 89 year old man is being seen as a new patient for episodes of dizziness and room spinning sensation with nausea and vomiting for he past few months. He has medical history of carotid stenosis s/p left CEA, TIA, CAD and Diabetes. He is accompanied to the clinic with his daughter. He describes the episodes as room spinning sensation and imbalance follows by nausea and vomiting. The episodes last between 15 minutes to few hours and sometimes up to entire day during which he is not able to move or turn his head during the episodes. He has history of left sensorineural hearing loss. He was seen by ENT last week and is scheduled for Brain MRI next week. His recent carotid Doppler showed bilateral <50 percent stenosis with patent vertebral arteries.

## 2020-11-04 ENCOUNTER — APPOINTMENT (OUTPATIENT)
Dept: CARDIOLOGY | Facility: CLINIC | Age: 85
End: 2020-11-04
Payer: MEDICARE

## 2020-11-04 PROCEDURE — 93978 VASCULAR STUDY: CPT

## 2020-11-06 ENCOUNTER — APPOINTMENT (OUTPATIENT)
Dept: CARDIOLOGY | Facility: CLINIC | Age: 85
End: 2020-11-06
Payer: MEDICARE

## 2020-11-06 VITALS
OXYGEN SATURATION: 96 % | BODY MASS INDEX: 30.7 KG/M2 | HEART RATE: 60 BPM | WEIGHT: 191 LBS | RESPIRATION RATE: 16 BRPM | HEIGHT: 66 IN | TEMPERATURE: 98.3 F | SYSTOLIC BLOOD PRESSURE: 150 MMHG | DIASTOLIC BLOOD PRESSURE: 70 MMHG

## 2020-11-06 VITALS — SYSTOLIC BLOOD PRESSURE: 110 MMHG | DIASTOLIC BLOOD PRESSURE: 70 MMHG

## 2020-11-06 PROCEDURE — 99214 OFFICE O/P EST MOD 30 MIN: CPT

## 2020-11-06 PROCEDURE — 93000 ELECTROCARDIOGRAM COMPLETE: CPT

## 2020-11-06 RX ORDER — ASPIRIN 81 MG/1
81 TABLET, COATED ORAL DAILY
Refills: 0 | Status: ACTIVE | COMMUNITY

## 2020-11-06 RX ORDER — MULTIVITAMIN
TABLET ORAL DAILY
Refills: 0 | Status: ACTIVE | COMMUNITY

## 2020-11-06 RX ORDER — INSULIN ASPART 100 [IU]/ML
100 INJECTION, SOLUTION INTRAVENOUS; SUBCUTANEOUS
Refills: 0 | Status: ACTIVE | COMMUNITY

## 2020-11-06 RX ORDER — FAMOTIDINE 40 MG/1
40 TABLET, FILM COATED ORAL DAILY
Refills: 0 | Status: DISCONTINUED | COMMUNITY
End: 2020-11-06

## 2020-11-06 NOTE — PHYSICAL EXAM
[General Appearance - Well Developed] : well developed [Normal Appearance] : normal appearance [Well Groomed] : well groomed [General Appearance - Well Nourished] : well nourished [No Deformities] : no deformities [General Appearance - In No Acute Distress] : no acute distress [Normal Conjunctiva] : the conjunctiva exhibited no abnormalities [Eyelids - No Xanthelasma] : the eyelids demonstrated no xanthelasmas [Normal Oral Mucosa] : normal oral mucosa [No Oral Pallor] : no oral pallor [No Oral Cyanosis] : no oral cyanosis [Normal Jugular Venous A Waves Present] : normal jugular venous A waves present [Normal Jugular Venous V Waves Present] : normal jugular venous V waves present [No Jugular Venous Saavedra A Waves] : no jugular venous saavedra A waves [Respiration, Rhythm And Depth] : normal respiratory rhythm and effort [Exaggerated Use Of Accessory Muscles For Inspiration] : no accessory muscle use [Auscultation Breath Sounds / Voice Sounds] : lungs were clear to auscultation bilaterally [Abdomen Soft] : soft [Abdomen Tenderness] : non-tender [Abdomen Mass (___ Cm)] : no abdominal mass palpated [Abnormal Walk] : normal gait [Gait - Sufficient For Exercise Testing] : the gait was sufficient for exercise testing [Nail Clubbing] : no clubbing of the fingernails [Cyanosis, Localized] : no localized cyanosis [Petechial Hemorrhages (___cm)] : no petechial hemorrhages [Skin Color & Pigmentation] : normal skin color and pigmentation [] : no rash [No Venous Stasis] : no venous stasis [Skin Lesions] : no skin lesions [No Skin Ulcers] : no skin ulcer [No Xanthoma] : no  xanthoma was observed [Oriented To Time, Place, And Person] : oriented to person, place, and time [Affect] : the affect was normal [Mood] : the mood was normal [No Anxiety] : not feeling anxious [5th Left ICS - MCL] : palpated at the 5th LICS in the midclavicular line [Normal Rate] : normal [Normal S1] : normal S1 [Normal S2] : normal S2 [No Murmur] : no murmurs heard [2+] : left 2+ [No Abnormalities] : the abdominal aorta was not enlarged and no bruit was heard [No Pitting Edema] : no pitting edema present [Heart Rate And Rhythm] : heart rate and rhythm were normal [Heart Sounds] : normal S1 and S2 [Murmurs] : no murmurs present [Arterial Pulses Normal] : the arterial pulses were normal [Edema] : no peripheral edema present [S3] : no S3 [S4] : no S4 [Right Carotid Bruit] : no bruit heard over the right carotid [Left Carotid Bruit] : no bruit heard over the left carotid [Right Femoral Bruit] : no bruit heard over the right femoral artery [Left Femoral Bruit] : no bruit heard over the left femoral artery

## 2020-11-06 NOTE — DISCUSSION/SUMMARY
[___ Month(s)] : [unfilled] month(s) [With Me] : with me [FreeTextEntry1] : Ekg results were reviewed.\par Recent lab results were reviewed.\par Echocardiogram results were reviewed.\par Carotid artery duplex results were reviewed.\par Holter monitor results were reviewed.\par Abdominal aortic duplex reviewed\par PVR results were reviewed.\par \par Fasting CBC, BMP, LFTs, Lipid Profile, Mg, Ca prior to next visit\par f/u 3 months\par \par

## 2020-11-06 NOTE — REASON FOR VISIT
[Carotid Artery Stenosis] : carotid stenosis [Coronary Artery Disease] : coronary artery disease [Dizziness] : dizziness [Hyperlipidemia] : hyperlipidemia [Hypertension] : hypertension [Follow-Up - Clinic] : a clinic follow-up of

## 2020-11-06 NOTE — ASSESSMENT
[FreeTextEntry1] : HTN - controlled\par High cholesterol - controlled\par DM - stable\par TIAs - stable\par MI - stable\par Dizziness - stable\par Multiple Myeloma - stable\par Left carotid endarterectomy - 2010 - stable\par Gout - stable

## 2020-11-06 NOTE — HISTORY OF PRESENT ILLNESS
[FreeTextEntry1] : 89 year old male came in for evaluation of HTN, high cholesterol and dizziness.  He has been intermittently dizzy for a few months.  The patient describes the room spinning counter clockwise.  He had a bleeding nose with a BP of 228 a few weeks ago.  He has had high BP for 30 years.  He has high cholesterol for 30 years.  He has DM.  The patient denies chest pains, shortness of breath, palpitations, loss of consciousness, or swelling of the ankles.  He had a MI 7 years ago.   He had a temp PPM 3 years ago and had bradycardia due to medication.  He has multiple myeloma.  He has had TIAS over the last several years.\par \par The patient smoked 1 ppd for 16 years and quit 53 years ago.  He never vaped.  The patient denies substance abuse.  The patient sometimes snore.  He has no difficulty sleeping.  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

## 2020-11-19 ENCOUNTER — LABORATORY RESULT (OUTPATIENT)
Age: 85
End: 2020-11-19

## 2020-11-30 ENCOUNTER — APPOINTMENT (OUTPATIENT)
Dept: CARDIOLOGY | Facility: CLINIC | Age: 85
End: 2020-11-30
Payer: MEDICARE

## 2020-11-30 VITALS
OXYGEN SATURATION: 96 % | TEMPERATURE: 97.3 F | HEART RATE: 60 BPM | HEIGHT: 66 IN | SYSTOLIC BLOOD PRESSURE: 122 MMHG | WEIGHT: 188 LBS | BODY MASS INDEX: 30.22 KG/M2 | RESPIRATION RATE: 16 BRPM | DIASTOLIC BLOOD PRESSURE: 70 MMHG

## 2020-11-30 PROCEDURE — 99214 OFFICE O/P EST MOD 30 MIN: CPT

## 2020-11-30 RX ORDER — CLOPIDOGREL 75 MG/1
75 TABLET, FILM COATED ORAL DAILY
Refills: 0 | Status: DISCONTINUED | COMMUNITY
End: 2020-11-30

## 2020-11-30 RX ORDER — FUROSEMIDE 40 MG/1
40 TABLET ORAL DAILY
Refills: 0 | Status: DISCONTINUED | COMMUNITY
End: 2020-11-30

## 2020-11-30 RX ORDER — FEBUXOSTAT 40 MG/1
40 TABLET ORAL DAILY
Refills: 0 | Status: DISCONTINUED | COMMUNITY
End: 2020-11-30

## 2020-11-30 NOTE — ASSESSMENT
[FreeTextEntry1] : HTN - controlled\par High cholesterol - controlled\par DM - stable\par TIAs - stable\par MI - stable\par Dizziness - stable\par Multiple Myeloma - stable\par Left carotid endarterectomy - 2010 - stable\par Gout - stable\par Low magnesiium - new

## 2020-11-30 NOTE — PHYSICAL EXAM
[General Appearance - Well Developed] : well developed [Normal Appearance] : normal appearance [Well Groomed] : well groomed [General Appearance - Well Nourished] : well nourished [No Deformities] : no deformities [General Appearance - In No Acute Distress] : no acute distress [Normal Conjunctiva] : the conjunctiva exhibited no abnormalities [Eyelids - No Xanthelasma] : the eyelids demonstrated no xanthelasmas [Normal Oral Mucosa] : normal oral mucosa [No Oral Pallor] : no oral pallor [No Oral Cyanosis] : no oral cyanosis [Normal Jugular Venous A Waves Present] : normal jugular venous A waves present [Normal Jugular Venous V Waves Present] : normal jugular venous V waves present [No Jugular Venous Saavedra A Waves] : no jugular venous saavedra A waves [Respiration, Rhythm And Depth] : normal respiratory rhythm and effort [Exaggerated Use Of Accessory Muscles For Inspiration] : no accessory muscle use [Auscultation Breath Sounds / Voice Sounds] : lungs were clear to auscultation bilaterally [Heart Rate And Rhythm] : heart rate and rhythm were normal [Heart Sounds] : normal S1 and S2 [Murmurs] : no murmurs present [Arterial Pulses Normal] : the arterial pulses were normal [Edema] : no peripheral edema present [Abdomen Soft] : soft [Abdomen Tenderness] : non-tender [Abdomen Mass (___ Cm)] : no abdominal mass palpated [Abnormal Walk] : normal gait [Gait - Sufficient For Exercise Testing] : the gait was sufficient for exercise testing [Nail Clubbing] : no clubbing of the fingernails [Cyanosis, Localized] : no localized cyanosis [Petechial Hemorrhages (___cm)] : no petechial hemorrhages [Skin Color & Pigmentation] : normal skin color and pigmentation [] : no rash [No Venous Stasis] : no venous stasis [Skin Lesions] : no skin lesions [No Skin Ulcers] : no skin ulcer [No Xanthoma] : no  xanthoma was observed [Oriented To Time, Place, And Person] : oriented to person, place, and time [Affect] : the affect was normal [Mood] : the mood was normal [No Anxiety] : not feeling anxious [5th Left ICS - MCL] : palpated at the 5th LICS in the midclavicular line [Normal Rate] : normal [Normal S1] : normal S1 [Normal S2] : normal S2 [No Murmur] : no murmurs heard [2+] : left 2+ [No Abnormalities] : the abdominal aorta was not enlarged and no bruit was heard [No Pitting Edema] : no pitting edema present [S3] : no S3 [S4] : no S4 [Right Carotid Bruit] : no bruit heard over the right carotid [Left Carotid Bruit] : no bruit heard over the left carotid [Right Femoral Bruit] : no bruit heard over the right femoral artery [Left Femoral Bruit] : no bruit heard over the left femoral artery

## 2020-11-30 NOTE — DISCUSSION/SUMMARY
[___ Month(s)] : [unfilled] month(s) [With Me] : with me [FreeTextEntry1] : Recent lab results were reviewed.\par \par Stop furosemide due to low Mg\par Stop clopidogrel due to recurrent nose bleeds\par Mg Oxide 500 mg po bid\par f/u nephrology - for CKD and hypomagnesiumemia\par f/u 3 months

## 2020-11-30 NOTE — HISTORY OF PRESENT ILLNESS
[FreeTextEntry1] : 89 year old male came in for evaluation of HTN, high cholesterol and dizziness.  He has been intermittently dizzy for a few months.  The patient describes the room spinning counter clockwise.  He had a bleeding nose with a BP of 228 a few weeks ago.  He has had high BP for 30 years.  He has high cholesterol for 30 years.  He has DM.  The patient denies chest pains, shortness of breath, palpitations, loss of consciousness, or swelling of the ankles.  He had a MI 7 years ago.   He had a temp PPM 3 years ago and had bradycardia due to medication.  He has multiple myeloma.  He has had TIAS over the last several years.\par \par The patient smoked 1 ppd for 16 years and quit 53 years ago.  He never vaped.  The patient denies substance abuse.  The patient sometimes snore.  He has no difficulty sleeping.  He does not doze off during the day.\par \par \par The patient was worked up and found to hav BPPV (Benign paroxysmal positional vertigo).\par \par The patinet on 11/8/2020 had HTN episode with BP of 208 and had a nose bleed.  He was started on Metoprolol EES 50 mg po od.\par \par He has felt better since then.\par \par PMHX:\par \par HTN - controlled\par High cholesterol - controlled\par DM - stable\par TIAs - stable\par MI - stable\par Dizziness - stable\par Multiple Myeloma - stable\par Left carotid endarterectomy - 2010 - stable\par Gout - stable\par Low magnesiium - new\par \par

## 2020-11-30 NOTE — REASON FOR VISIT
[Follow-Up - Clinic] : a clinic follow-up of [Carotid Artery Stenosis] : carotid stenosis [Coronary Artery Disease] : coronary artery disease [Dizziness] : dizziness [Hyperlipidemia] : hyperlipidemia [Hypertension] : hypertension

## 2021-01-08 ENCOUNTER — OUTPATIENT (OUTPATIENT)
Dept: OUTPATIENT SERVICES | Facility: HOSPITAL | Age: 86
LOS: 1 days | Discharge: HOME | End: 2021-01-08

## 2021-01-08 DIAGNOSIS — H90.3 SENSORINEURAL HEARING LOSS, BILATERAL: ICD-10-CM

## 2021-01-11 ENCOUNTER — APPOINTMENT (OUTPATIENT)
Dept: NEUROLOGY | Facility: CLINIC | Age: 86
End: 2021-01-11

## 2021-01-13 ENCOUNTER — APPOINTMENT (OUTPATIENT)
Dept: NEUROLOGY | Facility: CLINIC | Age: 86
End: 2021-01-13
Payer: MEDICARE

## 2021-01-13 VITALS
OXYGEN SATURATION: 97 % | WEIGHT: 210 LBS | BODY MASS INDEX: 33.75 KG/M2 | SYSTOLIC BLOOD PRESSURE: 120 MMHG | HEART RATE: 62 BPM | DIASTOLIC BLOOD PRESSURE: 68 MMHG | TEMPERATURE: 98 F | HEIGHT: 66 IN

## 2021-01-13 PROCEDURE — 99213 OFFICE O/P EST LOW 20 MIN: CPT

## 2021-01-13 RX ORDER — BLOOD SUGAR DIAGNOSTIC
STRIP MISCELLANEOUS
Qty: 300 | Refills: 0 | Status: ACTIVE | COMMUNITY
Start: 2020-08-12

## 2021-01-13 RX ORDER — ISOPROPYL ALCOHOL 0.75 G/1
SWAB TOPICAL
Qty: 500 | Refills: 0 | Status: ACTIVE | COMMUNITY
Start: 2020-10-28

## 2021-01-13 RX ORDER — LANCETS 28 GAUGE
EACH MISCELLANEOUS
Qty: 300 | Refills: 0 | Status: ACTIVE | COMMUNITY
Start: 2020-10-05

## 2021-01-13 RX ORDER — PEN NEEDLE, DIABETIC 29 G X1/2"
31G X 5 MM NEEDLE, DISPOSABLE MISCELLANEOUS
Qty: 100 | Refills: 0 | Status: ACTIVE | COMMUNITY
Start: 2020-10-28

## 2021-01-13 NOTE — HISTORY OF PRESENT ILLNESS
[FreeTextEntry1] : KUN GLASER is a 89 year old man is here as a follow up visit for BPPV. He was first seen on November 2020. He has medical history of carotid stenosis s/p left CEA, TIA, CAD and Diabetes.He has episodes as room spinning sensation and imbalance follows by nausea and vomiting for 15 minutes to few hours and sometimes up to entire day. Brain MRI showed no evidence of acoustic schwannoma or acute infarct with chronic lacunar infarct and moderate cortical volume loss. In the last visit he was referred to do vestibular rehab. He has been doing much better with vestibular rehab and Epley maneuvers. Few weeks ago he has another episode of room spinning sensation when he stopped the training for one week. For the past few weeks he had light headedness. Dr Hu stopped his Lasix but then resumed it due worsening leg edema.

## 2021-01-13 NOTE — REVIEW OF SYSTEMS
[Dizziness] : dizziness [Lightheadedness] : lightheadedness [Vertigo] : vertigo [Lower Ext Edema] : lower extremity edema [Negative] : Gastrointestinal

## 2021-01-13 NOTE — ASSESSMENT
[FreeTextEntry1] : KUN GLASER is a 89 year old man is being seen as a follow up patient for BPPV. Brain MRI showed brain stem lesion or acute infarct. He is doing better with vestibular rehab but had one episode relapse few weeks ago.  He now reports light headedness which could be due to dehydration and carotid stenosis. He was encouraged about proper hydration. He would continue vestibular rehab and I will prescribe Meclizine PRN for acute vertigo. \par \par - cw Vestibular rehab \par - Meclizine 12.5 mg PRN \par - RTC in 6 months. \par

## 2021-01-21 ENCOUNTER — OUTPATIENT (OUTPATIENT)
Dept: OUTPATIENT SERVICES | Facility: HOSPITAL | Age: 86
LOS: 1 days | Discharge: HOME | End: 2021-01-21

## 2021-01-21 DIAGNOSIS — H90.3 SENSORINEURAL HEARING LOSS, BILATERAL: ICD-10-CM

## 2021-03-18 ENCOUNTER — LABORATORY RESULT (OUTPATIENT)
Age: 86
End: 2021-03-18

## 2021-03-19 ENCOUNTER — APPOINTMENT (OUTPATIENT)
Dept: CARDIOLOGY | Facility: CLINIC | Age: 86
End: 2021-03-19
Payer: MEDICARE

## 2021-03-19 VITALS — SYSTOLIC BLOOD PRESSURE: 128 MMHG | DIASTOLIC BLOOD PRESSURE: 60 MMHG

## 2021-03-19 VITALS
BODY MASS INDEX: 29.73 KG/M2 | TEMPERATURE: 97.8 F | OXYGEN SATURATION: 96 % | RESPIRATION RATE: 16 BRPM | DIASTOLIC BLOOD PRESSURE: 70 MMHG | WEIGHT: 185 LBS | HEART RATE: 62 BPM | HEIGHT: 66 IN | SYSTOLIC BLOOD PRESSURE: 130 MMHG

## 2021-03-19 PROCEDURE — 99213 OFFICE O/P EST LOW 20 MIN: CPT

## 2021-03-19 PROCEDURE — 93000 ELECTROCARDIOGRAM COMPLETE: CPT

## 2021-03-19 RX ORDER — FAMOTIDINE 40 MG/1
40 TABLET, FILM COATED ORAL
Refills: 0 | Status: ACTIVE | COMMUNITY

## 2021-03-19 RX ORDER — OMEGA-3/DHA/EPA/FISH OIL 1200 MG
1200 CAPSULE ORAL TWICE DAILY
Refills: 0 | Status: ACTIVE | COMMUNITY

## 2021-03-19 RX ORDER — COLCHICINE 0.6 MG/1
0.6 TABLET, FILM COATED ORAL DAILY
Refills: 0 | Status: ACTIVE | COMMUNITY

## 2021-03-19 RX ORDER — SIMVASTATIN 10 MG/1
10 TABLET, FILM COATED ORAL DAILY
Refills: 0 | Status: ACTIVE | COMMUNITY

## 2021-03-19 RX ORDER — ERGOCALCIFEROL 1.25 MG/1
1.25 MG CAPSULE, LIQUID FILLED ORAL
Refills: 0 | Status: ACTIVE | COMMUNITY

## 2021-03-19 RX ORDER — BACILLUS COAGULANS/INULIN 1B-250 MG
CAPSULE ORAL DAILY
Refills: 0 | Status: ACTIVE | COMMUNITY

## 2021-03-19 RX ORDER — LOSARTAN POTASSIUM 50 MG/1
50 TABLET, FILM COATED ORAL DAILY
Refills: 0 | Status: ACTIVE | COMMUNITY

## 2021-03-19 RX ORDER — CALCITRIOL 0.5 UG/1
0.5 CAPSULE, LIQUID FILLED ORAL DAILY
Refills: 0 | Status: ACTIVE | COMMUNITY

## 2021-03-19 RX ORDER — ALLOPURINOL 100 MG/1
100 TABLET ORAL DAILY
Qty: 90 | Refills: 3 | Status: ACTIVE | COMMUNITY
Start: 2020-09-17

## 2021-03-19 NOTE — HISTORY OF PRESENT ILLNESS
[FreeTextEntry1] : 89 year old male came in for evaluation of HTN, high cholesterol and dizziness.  He has been intermittently dizzy for a few months.  The patient describes the room spinning counter clockwise.  He had a bleeding nose with a BP of 228 a few weeks ago.  He has had high BP for 30 years.  He has high cholesterol for 30 years.  He has DM.  The patient denies chest pains, shortness of breath, palpitations, loss of consciousness, or swelling of the ankles.  He had a MI 7 years ago.   He had a temp PPM 3 years ago and had bradycardia due to medication.  He has multiple myeloma.  He has had TIAS over the last several years.\par \par The patient smoked 1 ppd for 16 years and quit 53 years ago.  He never vaped.  The patient denies substance abuse.  The patient sometimes snore.  He has no difficulty sleeping.  He does not doze off during the day.\par \par \par The patient was worked up and found to hav BPPV (Benign paroxysmal positional vertigo).\par \par The patinet on 11/8/2020 had HTN episode with BP of 208 and had a nose bleed.  He was started on Metoprolol EES 50 mg po od.\par \par He has felt better since then.\par \par The patient has been dizzy lately but it is probably due to his blood sugar being erratic.    The patient just had labs done yesterday.\par \par PMHX:\par \par HTN - controlled\par High cholesterol - controlled\par DM - stable\par TIAs - stable\par MI - stable\par Dizziness - stable\par Multiple Myeloma - stable\par Left carotid endarterectomy - 2010 - stable\par Gout - stable\par Low magnesiium - stable\par \par Medications reviewed and reconciled with patient.  Patient is compliant with taking appropriate medications at appropriate doses at appropriate intervals without missing doses.\par \par

## 2021-03-19 NOTE — ASSESSMENT
[FreeTextEntry1] : HTN - controlled\par High cholesterol - controlled\par DM - stable\par TIAs - stable\par MI - stable\par Dizziness - stable\par Multiple Myeloma - stable\par Left carotid endarterectomy - 2010 - stable\par Gout - stable\par Low magnesiium - stable

## 2021-03-19 NOTE — PHYSICAL EXAM
[General Appearance - Well Developed] : well developed [Normal Appearance] : normal appearance [Well Groomed] : well groomed [General Appearance - Well Nourished] : well nourished [No Deformities] : no deformities [General Appearance - In No Acute Distress] : no acute distress [Normal Conjunctiva] : the conjunctiva exhibited no abnormalities [Eyelids - No Xanthelasma] : the eyelids demonstrated no xanthelasmas [Normal Oral Mucosa] : normal oral mucosa [No Oral Pallor] : no oral pallor [No Oral Cyanosis] : no oral cyanosis [Normal Jugular Venous A Waves Present] : normal jugular venous A waves present [Normal Jugular Venous V Waves Present] : normal jugular venous V waves present [No Jugular Venous Saavedra A Waves] : no jugular venous saavedra A waves [Respiration, Rhythm And Depth] : normal respiratory rhythm and effort [Exaggerated Use Of Accessory Muscles For Inspiration] : no accessory muscle use [Auscultation Breath Sounds / Voice Sounds] : lungs were clear to auscultation bilaterally [Heart Rate And Rhythm] : heart rate and rhythm were normal [Heart Sounds] : normal S1 and S2 [Murmurs] : no murmurs present [Arterial Pulses Normal] : the arterial pulses were normal [Edema] : no peripheral edema present [Abdomen Soft] : soft [Abdomen Tenderness] : non-tender [Abdomen Mass (___ Cm)] : no abdominal mass palpated [Abnormal Walk] : normal gait [Gait - Sufficient For Exercise Testing] : the gait was sufficient for exercise testing [Nail Clubbing] : no clubbing of the fingernails [Cyanosis, Localized] : no localized cyanosis [Petechial Hemorrhages (___cm)] : no petechial hemorrhages [Skin Color & Pigmentation] : normal skin color and pigmentation [] : no rash [No Venous Stasis] : no venous stasis [Skin Lesions] : no skin lesions [No Skin Ulcers] : no skin ulcer [No Xanthoma] : no  xanthoma was observed [Oriented To Time, Place, And Person] : oriented to person, place, and time [Affect] : the affect was normal [Mood] : the mood was normal [No Anxiety] : not feeling anxious

## 2021-03-19 NOTE — DISCUSSION/SUMMARY
[___ Month(s)] : [unfilled] month(s) [With Me] : with me [FreeTextEntry1] : Ekg results were reviewed.\par \par Fasting CBC, BMP, LFTs, Lipid Profile, Mg, Ca prior to next visit\par f/u 3 months\par \par Number/complexity of problems -  Mod - 2 or more stable chronic illnesses\par Amount/complexity of data -history, exam, reviewed old note, ekg reviewed\par Risk of complications - Low\par

## 2021-04-20 ENCOUNTER — LABORATORY RESULT (OUTPATIENT)
Age: 86
End: 2021-04-20

## 2021-06-18 ENCOUNTER — APPOINTMENT (OUTPATIENT)
Dept: CARDIOLOGY | Facility: CLINIC | Age: 86
End: 2021-06-18
Payer: MEDICARE

## 2021-06-18 VITALS
SYSTOLIC BLOOD PRESSURE: 110 MMHG | HEART RATE: 56 BPM | DIASTOLIC BLOOD PRESSURE: 70 MMHG | HEIGHT: 66 IN | TEMPERATURE: 97.2 F | RESPIRATION RATE: 16 BRPM | OXYGEN SATURATION: 97 % | WEIGHT: 180 LBS | BODY MASS INDEX: 28.93 KG/M2

## 2021-06-18 DIAGNOSIS — Z00.00 ENCOUNTER FOR GENERAL ADULT MEDICAL EXAMINATION W/OUT ABNORMAL FINDINGS: ICD-10-CM

## 2021-06-18 PROCEDURE — 99213 OFFICE O/P EST LOW 20 MIN: CPT

## 2021-06-18 PROCEDURE — 93000 ELECTROCARDIOGRAM COMPLETE: CPT

## 2021-06-18 RX ORDER — INSULIN DEGLUDEC INJECTION 100 U/ML
100 INJECTION, SOLUTION SUBCUTANEOUS
Refills: 0 | Status: DISCONTINUED | COMMUNITY
End: 2021-06-18

## 2021-06-18 RX ORDER — LEVOTHYROXINE SODIUM 0.03 MG/1
25 TABLET ORAL DAILY
Refills: 0 | Status: ACTIVE | COMMUNITY

## 2021-06-18 RX ORDER — THIAMINE HCL 100 MG
500 TABLET ORAL DAILY
Refills: 0 | Status: ACTIVE | COMMUNITY

## 2021-06-18 RX ORDER — MECLIZINE HYDROCHLORIDE 12.5 MG/1
12.5 TABLET ORAL 3 TIMES DAILY
Qty: 90 | Refills: 3 | Status: DISCONTINUED | COMMUNITY
Start: 2021-01-13 | End: 2021-06-18

## 2021-06-18 RX ORDER — ASCORBIC ACID 125 MG
500 TABLET,CHEWABLE ORAL TWICE DAILY
Qty: 180 | Refills: 3 | Status: DISCONTINUED | COMMUNITY
Start: 2020-11-30 | End: 2021-06-18

## 2021-06-18 NOTE — DISCUSSION/SUMMARY
[With Me] : with me [___ Month(s)] : in [unfilled] month(s) [FreeTextEntry1] : Ekg results were reviewed.\par Recent lab results were reviewed.\par \par Fasting CBC, BMP, LFTs, Lipid Profile, HgbA1c, CRP, UA, Urine Microalbumin, Mg, Ca, TSH, T3, T4 prior to next visit\par f/u 6 month\par \par \par Number/complexity of problems -  Mod - 2 or more stable chronic illnesses\par Amount/complexity of data -history, exam, reviewed old note, labs reviewed, ekg reviewed\par Risk of complications - Low\par

## 2021-06-18 NOTE — REASON FOR VISIT
[Follow-Up - Clinic] : a clinic follow-up of [Carotid Artery Stenosis] : carotid stenosis [Dizziness] : dizziness [Hypertension] : hypertension [Hyperlipidemia] : hyperlipidemia [Coronary Artery Disease] : coronary artery disease [Carotid, Aortic and Peripheral Vascular Disease] : carotid, aortic and peripheral vascular disease

## 2021-07-19 ENCOUNTER — APPOINTMENT (OUTPATIENT)
Dept: NEUROLOGY | Facility: CLINIC | Age: 86
End: 2021-07-19
Payer: MEDICARE

## 2021-07-22 ENCOUNTER — APPOINTMENT (OUTPATIENT)
Dept: NEUROLOGY | Facility: CLINIC | Age: 86
End: 2021-07-22
Payer: MEDICARE

## 2021-07-22 DIAGNOSIS — H81.10 BENIGN PAROXYSMAL VERTIGO, UNSPECIFIED EAR: ICD-10-CM

## 2021-07-22 PROCEDURE — 99212 OFFICE O/P EST SF 10 MIN: CPT | Mod: 95

## 2021-07-23 PROBLEM — H81.10 BPPV (BENIGN PAROXYSMAL POSITIONAL VERTIGO): Status: ACTIVE | Noted: 2020-10-21

## 2021-07-23 NOTE — HISTORY OF PRESENT ILLNESS
[Other Location: e.g. Home (Enter Location, City,State)___] : at [unfilled] [Family Member] : family member [Verbal consent obtained from patient] : the patient, [unfilled] [Time Spent: ___ minutes] : I have spent [unfilled] minutes with the patient on the telephone [FreeTextEntry3] : Daughter  [FreeTextEntry1] : KUN GLASER is a 89 year old man is being followed via tele health visit for BPPV and dizziness. Visit was done via tele medicine and face to face connection was obtained via [employer]\par \par He has medical history of carotid stenosis s/p left CEA, TIA, CAD and Diabetes.He has episodes as room spinning sensation and imbalance follows by nausea and vomiting for 15 minutes to few hours and sometimes up to entire day. Brain MRI showed no evidence of acoustic schwannoma or acute infarct with chronic lacunar infarct and moderate cortical volume loss. He was referred to do vestibular rehab and  been doing much better with vestibular rehab and Epley maneuvers. For the past few months he had issues with his shoulder and could not do the rehab more than once a week. He has less frequent major episodes but had one episode about 1.5 months ago. Per daughter he has more frequent minor episodes of dizziness and sometimes light headednes \par Still reports more frequent episodes of brief dizziness and light headedness lasting for few minutes to seconds. Sometimes patient takes Meclizine and sleeps for entire day. He uses walker for ambulation and had one near fall episode last week. Patient is now scheduled for vestibular rehab for two cessions a week.

## 2021-07-23 NOTE — PLAN
[FreeTextEntry1] : 89 year old man is being seen as a follow up patient for BPPV. Brain MRI showed brain stem lesion or acute infarct. Patient was doing better with vestibular rehab but could not do more than one cession a week recently due to shoulder pain. He is now schedule for two cessions a week.\par \par - cw Vestibular rehab for two cessions a week \par - Meclizine 12.5 mg PRN \par - RTC in 6 months. \par

## 2021-08-19 ENCOUNTER — RX RENEWAL (OUTPATIENT)
Age: 86
End: 2021-08-19

## 2021-09-27 ENCOUNTER — RESULT REVIEW (OUTPATIENT)
Age: 86
End: 2021-09-27

## 2021-09-27 ENCOUNTER — OUTPATIENT (OUTPATIENT)
Dept: OUTPATIENT SERVICES | Facility: HOSPITAL | Age: 86
LOS: 1 days | Discharge: HOME | End: 2021-09-27
Payer: MEDICARE

## 2021-09-27 VITALS
HEIGHT: 66 IN | SYSTOLIC BLOOD PRESSURE: 143 MMHG | OXYGEN SATURATION: 97 % | RESPIRATION RATE: 15 BRPM | HEART RATE: 89 BPM | DIASTOLIC BLOOD PRESSURE: 66 MMHG | TEMPERATURE: 98 F

## 2021-09-27 DIAGNOSIS — G56.01 CARPAL TUNNEL SYNDROME, RIGHT UPPER LIMB: ICD-10-CM

## 2021-09-27 DIAGNOSIS — Z01.818 ENCOUNTER FOR OTHER PREPROCEDURAL EXAMINATION: ICD-10-CM

## 2021-09-27 DIAGNOSIS — Z98.890 OTHER SPECIFIED POSTPROCEDURAL STATES: Chronic | ICD-10-CM

## 2021-09-27 DIAGNOSIS — Z98.49 CATARACT EXTRACTION STATUS, UNSPECIFIED EYE: Chronic | ICD-10-CM

## 2021-09-27 LAB
BASOPHILS # BLD AUTO: 0.04 K/UL — SIGNIFICANT CHANGE UP (ref 0–0.2)
BASOPHILS NFR BLD AUTO: 0.5 % — SIGNIFICANT CHANGE UP (ref 0–1)
EOSINOPHIL # BLD AUTO: 0.71 K/UL — HIGH (ref 0–0.7)
EOSINOPHIL NFR BLD AUTO: 9.3 % — HIGH (ref 0–8)
HCT VFR BLD CALC: 37.9 % — LOW (ref 42–52)
HGB BLD-MCNC: 12.2 G/DL — LOW (ref 14–18)
IMM GRANULOCYTES NFR BLD AUTO: 0.1 % — SIGNIFICANT CHANGE UP (ref 0.1–0.3)
LYMPHOCYTES # BLD AUTO: 3.01 K/UL — SIGNIFICANT CHANGE UP (ref 1.2–3.4)
LYMPHOCYTES # BLD AUTO: 39.3 % — SIGNIFICANT CHANGE UP (ref 20.5–51.1)
MCHC RBC-ENTMCNC: 30.3 PG — SIGNIFICANT CHANGE UP (ref 27–31)
MCHC RBC-ENTMCNC: 32.2 G/DL — SIGNIFICANT CHANGE UP (ref 32–37)
MCV RBC AUTO: 94.3 FL — HIGH (ref 80–94)
MONOCYTES # BLD AUTO: 0.7 K/UL — HIGH (ref 0.1–0.6)
MONOCYTES NFR BLD AUTO: 9.2 % — SIGNIFICANT CHANGE UP (ref 1.7–9.3)
NEUTROPHILS # BLD AUTO: 3.18 K/UL — SIGNIFICANT CHANGE UP (ref 1.4–6.5)
NEUTROPHILS NFR BLD AUTO: 41.6 % — LOW (ref 42.2–75.2)
NRBC # BLD: 0 /100 WBCS — SIGNIFICANT CHANGE UP (ref 0–0)
PLATELET # BLD AUTO: 134 K/UL — SIGNIFICANT CHANGE UP (ref 130–400)
RBC # BLD: 4.02 M/UL — LOW (ref 4.7–6.1)
RBC # FLD: 13.6 % — SIGNIFICANT CHANGE UP (ref 11.5–14.5)
WBC # BLD: 7.65 K/UL — SIGNIFICANT CHANGE UP (ref 4.8–10.8)
WBC # FLD AUTO: 7.65 K/UL — SIGNIFICANT CHANGE UP (ref 4.8–10.8)

## 2021-09-27 PROCEDURE — 93010 ELECTROCARDIOGRAM REPORT: CPT

## 2021-09-27 PROCEDURE — 71046 X-RAY EXAM CHEST 2 VIEWS: CPT | Mod: 26

## 2021-09-27 RX ORDER — SIMVASTATIN 20 MG/1
1 TABLET, FILM COATED ORAL
Qty: 0 | Refills: 0 | DISCHARGE

## 2021-09-27 RX ORDER — ERGOCALCIFEROL 1.25 MG/1
1 CAPSULE ORAL
Qty: 0 | Refills: 0 | DISCHARGE

## 2021-09-27 RX ORDER — OMEGA-3 ACID ETHYL ESTERS 1 G
1 CAPSULE ORAL
Qty: 0 | Refills: 0 | DISCHARGE

## 2021-09-27 RX ORDER — ASPIRIN/CALCIUM CARB/MAGNESIUM 324 MG
1 TABLET ORAL
Qty: 0 | Refills: 0 | DISCHARGE

## 2021-09-27 RX ORDER — METOPROLOL TARTRATE 50 MG
1 TABLET ORAL
Qty: 0 | Refills: 0 | DISCHARGE

## 2021-09-27 RX ORDER — L.ACIDOPH/B.ANIMALIS/B.LONGUM 15B CELL
1 CAPSULE ORAL
Qty: 0 | Refills: 0 | DISCHARGE

## 2021-09-27 RX ORDER — LEVOTHYROXINE SODIUM 125 MCG
1 TABLET ORAL
Qty: 0 | Refills: 0 | DISCHARGE

## 2021-09-27 RX ORDER — FAMOTIDINE 10 MG/ML
1 INJECTION INTRAVENOUS
Qty: 0 | Refills: 0 | DISCHARGE

## 2021-09-27 RX ORDER — CALCITRIOL 0.5 UG/1
1 CAPSULE ORAL
Qty: 0 | Refills: 0 | DISCHARGE

## 2021-09-27 RX ORDER — INSULIN ASPART 100 [IU]/ML
0 INJECTION, SOLUTION SUBCUTANEOUS
Qty: 0 | Refills: 0 | DISCHARGE

## 2021-09-27 RX ORDER — INSULIN DEGLUDEC 100 U/ML
0 INJECTION, SOLUTION SUBCUTANEOUS
Qty: 0 | Refills: 0 | DISCHARGE

## 2021-09-27 RX ORDER — ASCORBIC ACID 60 MG
1 TABLET,CHEWABLE ORAL
Qty: 0 | Refills: 0 | DISCHARGE

## 2021-09-27 NOTE — H&P PST ADULT - HISTORY OF PRESENT ILLNESS
PT PRESENTS TO PAST WITH NO SOB, CP, PALPITATIONS, DYSURIA, UTI OR URI AT PRESENT.   PT ABLE TO WALK UP  1/2  FLIGHTS OF STEPS WITH NO SOB- very slowly- pt amvulates with a walker.   AS PER THE PT, THIS IS HIS/HER COMPLETE MEDICAL AND SURGICAL HX, INCLUDING MEDICATIONS PRESCRIBED AND OVER THE COUNTER  pt denies any covid s/s, or tested positive in the past- PT AWARE OF DATE AND TIME FOR COVID TESTING PRIOR TO PROCEDURE.   pt advised self quarantine till day of procedure  denies travel outside the [place] in the past 30 days  Anesthesia Alert  NO--Difficult Airway  YES  --History of neck surgery or radiation  NO--Limited ROM of neck  NO--History of Malignant hyperthermia  NO--Personal or family history of Pseudocholinesterase deficiency  NO--Prior Anesthesia Complication  NO--Latex Allergy  NO--Loose teeth  NO--History of Rheumatoid Arthritis  NO--PANCHO  NO BLEEDING RISK  NO--Other_____

## 2021-09-27 NOTE — H&P PST ADULT - NSICDXPASTSURGICALHX_GEN_ALL_CORE_FT
PAST SURGICAL HISTORY:  H/O carotid endarterectomy     H/O cataract extraction     History of rectal polypectomy

## 2021-09-27 NOTE — H&P PST ADULT - NSICDXPASTMEDICALHX_GEN_ALL_CORE_FT
PAST MEDICAL HISTORY:  Acute myocardial infarction     DM (diabetes mellitus)     Gout     H/O vertigo     History of TIAs left inner ear damage    [place] (hard of hearing)     HTN (hypertension)     Hypothyroidism as per pt's daughter my dad had his thyroid levels drawn  within 2-3 months- the medication dose remained the same    Multiple myeloma

## 2021-09-27 NOTE — H&P PST ADULT - REASON FOR ADMISSION
Patient is a    89  year old    male presenting to PAST in preparation for right endoscopic carpal tunnel release possible open    on  10/18/21     under  lsb  anesthesia by Dr. Novak .  Pt reports-    my right wrist for many years. Recently it has gotten much worse.   The pain is a 5 out of 10.  The pain radiates into my hand and wrsi.  It is a radiating, throbbing and tingling pain.  The pain gets less sometimes when I rest.

## 2021-09-28 LAB
A1C WITH ESTIMATED AVERAGE GLUCOSE RESULT: 7.6 % — HIGH (ref 4–5.6)
ESTIMATED AVERAGE GLUCOSE: 171 MG/DL — HIGH (ref 68–114)

## 2021-10-04 PROBLEM — M10.9 GOUT, UNSPECIFIED: Chronic | Status: ACTIVE | Noted: 2021-09-27

## 2021-10-04 PROBLEM — Z87.898 PERSONAL HISTORY OF OTHER SPECIFIED CONDITIONS: Chronic | Status: ACTIVE | Noted: 2021-09-27

## 2021-10-04 PROBLEM — E03.9 HYPOTHYROIDISM, UNSPECIFIED: Chronic | Status: ACTIVE | Noted: 2021-09-27

## 2021-10-04 PROBLEM — I21.9 ACUTE MYOCARDIAL INFARCTION, UNSPECIFIED: Chronic | Status: ACTIVE | Noted: 2021-09-27

## 2021-10-04 PROBLEM — I10 ESSENTIAL (PRIMARY) HYPERTENSION: Chronic | Status: ACTIVE | Noted: 2021-09-27

## 2021-10-04 PROBLEM — H91.90 UNSPECIFIED HEARING LOSS, UNSPECIFIED EAR: Chronic | Status: ACTIVE | Noted: 2021-09-27

## 2021-10-04 PROBLEM — E11.9 TYPE 2 DIABETES MELLITUS WITHOUT COMPLICATIONS: Chronic | Status: ACTIVE | Noted: 2021-09-27

## 2021-10-04 PROBLEM — Z86.73 PERSONAL HISTORY OF TRANSIENT ISCHEMIC ATTACK (TIA), AND CEREBRAL INFARCTION WITHOUT RESIDUAL DEFICITS: Chronic | Status: ACTIVE | Noted: 2021-09-27

## 2021-10-04 PROBLEM — C90.00 MULTIPLE MYELOMA NOT HAVING ACHIEVED REMISSION: Chronic | Status: ACTIVE | Noted: 2021-09-27

## 2021-10-12 ENCOUNTER — APPOINTMENT (OUTPATIENT)
Dept: CARDIOLOGY | Facility: CLINIC | Age: 86
End: 2021-10-12
Payer: MEDICARE

## 2021-10-12 VITALS
DIASTOLIC BLOOD PRESSURE: 70 MMHG | SYSTOLIC BLOOD PRESSURE: 114 MMHG | HEART RATE: 54 BPM | OXYGEN SATURATION: 96 % | BODY MASS INDEX: 28.93 KG/M2 | RESPIRATION RATE: 16 BRPM | HEIGHT: 66 IN | WEIGHT: 180 LBS | TEMPERATURE: 97.4 F

## 2021-10-12 DIAGNOSIS — E83.42 HYPOMAGNESEMIA: ICD-10-CM

## 2021-10-12 PROCEDURE — 93000 ELECTROCARDIOGRAM COMPLETE: CPT

## 2021-10-12 PROCEDURE — 99214 OFFICE O/P EST MOD 30 MIN: CPT

## 2021-10-12 RX ORDER — MECLIZINE HYDROCHLORIDE 12.5 MG/1
12.5 TABLET ORAL
Refills: 0 | Status: DISCONTINUED | COMMUNITY
End: 2021-10-12

## 2021-10-12 NOTE — HISTORY OF PRESENT ILLNESS
[FreeTextEntry1] : 89 year old male came in for preoperative evaluation for right hand carpal tunnel surgery followed by left hand carpal tunnel surgery.  The patient has a history of HTN, high cholesterol, DM, TIA, left carotid endarterectomy, and multiple myeloma.\par \par PMHX:\par \par HTN - controlled\par High cholesterol - controlled\par DM - stable\par TIAs - stable\par MI - stable\par Dizziness - stable\par Multiple Myeloma - stable\par Left carotid endarterectomy - 2010 - stable\par Gout - stable\par Low magnesium - stable\par \par Medications reviewed and reconciled with patient.  Patient is compliant with taking appropriate medications at appropriate doses at appropriate intervals without missing doses.\par \par

## 2021-10-12 NOTE — DISCUSSION/SUMMARY
[With Me] : with me [FreeTextEntry3] : after testing [FreeTextEntry1] : Last visit note reviewed.\par Ekg results were reviewed.\par Recent lab results were reviewed.\par \par Fasting CBC, BMP, LFTs, Lipid Profile, HgbA1c, CRP, UA, Urine Microalbumin, Mg, Ca, TSH, T3, T4 prior to next visit\par Carotid artery duplex - s/p TIA, s/p endarterectomy\par Echocardiogram - h/o MI\par f/u nephrology\par Pre-operative evaluation is pending above test results.\par f/u after testing\par \par Number/complexity of problems - Mod - 2 or more chronic stable illnesses\par Amount/complexity of data -history, exam, reviewed old note, labs reviewed, ekg reviewed\par Risk of complications - Mod\par

## 2021-10-12 NOTE — REASON FOR VISIT
[Carotid, Aortic and Peripheral Vascular Disease] : carotid, aortic and peripheral vascular disease [Follow-Up - Clinic] : a clinic follow-up of [Carotid Artery Stenosis] : carotid stenosis [Coronary Artery Disease] : coronary artery disease [Dizziness] : dizziness [Hyperlipidemia] : hyperlipidemia [Hypertension] : hypertension

## 2021-11-03 ENCOUNTER — APPOINTMENT (OUTPATIENT)
Dept: CARDIOLOGY | Facility: CLINIC | Age: 86
End: 2021-11-03
Payer: MEDICARE

## 2021-11-03 DIAGNOSIS — R42 DIZZINESS AND GIDDINESS: ICD-10-CM

## 2021-11-03 DIAGNOSIS — I35.9 NONRHEUMATIC AORTIC VALVE DISORDER, UNSPECIFIED: ICD-10-CM

## 2021-11-03 DIAGNOSIS — I25.2 OLD MYOCARDIAL INFARCTION: ICD-10-CM

## 2021-11-03 PROCEDURE — 93880 EXTRACRANIAL BILAT STUDY: CPT

## 2021-11-03 PROCEDURE — 93306 TTE W/DOPPLER COMPLETE: CPT

## 2021-11-04 PROBLEM — R42 DIZZINESS: Status: ACTIVE | Noted: 2020-09-17

## 2021-11-04 PROBLEM — I25.2 OLD MYOCARDIAL INFARCTION: Status: ACTIVE | Noted: 2020-09-17

## 2021-11-04 PROBLEM — I35.9 AORTIC VALVE DISORDER: Status: ACTIVE | Noted: 2020-09-23

## 2021-11-19 ENCOUNTER — RX RENEWAL (OUTPATIENT)
Age: 86
End: 2021-11-19

## 2021-11-29 ENCOUNTER — APPOINTMENT (OUTPATIENT)
Dept: CARDIOLOGY | Facility: CLINIC | Age: 86
End: 2021-11-29

## 2022-01-10 ENCOUNTER — APPOINTMENT (OUTPATIENT)
Dept: CARDIOLOGY | Facility: CLINIC | Age: 87
End: 2022-01-10

## 2022-05-16 ENCOUNTER — RX RENEWAL (OUTPATIENT)
Age: 87
End: 2022-05-16

## 2022-05-16 RX ORDER — METOPROLOL SUCCINATE 50 MG/1
50 TABLET, EXTENDED RELEASE ORAL
Qty: 90 | Refills: 0 | Status: ACTIVE | COMMUNITY
Start: 2020-11-09 | End: 1900-01-01

## 2022-06-28 ENCOUNTER — NON-APPOINTMENT (OUTPATIENT)
Age: 87
End: 2022-06-28

## 2022-06-30 ENCOUNTER — TRANSCRIPTION ENCOUNTER (OUTPATIENT)
Age: 87
End: 2022-06-30

## 2022-06-30 ENCOUNTER — EMERGENCY (EMERGENCY)
Facility: HOSPITAL | Age: 87
LOS: 0 days | Discharge: HOME | End: 2022-06-30
Admitting: EMERGENCY MEDICINE

## 2022-06-30 ENCOUNTER — INPATIENT (INPATIENT)
Facility: HOSPITAL | Age: 87
LOS: 0 days | Discharge: HOME | End: 2022-07-01
Attending: INTERNAL MEDICINE | Admitting: INTERNAL MEDICINE

## 2022-06-30 VITALS
HEIGHT: 66 IN | HEART RATE: 68 BPM | RESPIRATION RATE: 18 BRPM | OXYGEN SATURATION: 97 % | TEMPERATURE: 99 F | WEIGHT: 179.9 LBS | DIASTOLIC BLOOD PRESSURE: 63 MMHG | SYSTOLIC BLOOD PRESSURE: 139 MMHG

## 2022-06-30 DIAGNOSIS — M10.9 GOUT, UNSPECIFIED: ICD-10-CM

## 2022-06-30 DIAGNOSIS — E03.9 HYPOTHYROIDISM, UNSPECIFIED: ICD-10-CM

## 2022-06-30 DIAGNOSIS — Z98.890 OTHER SPECIFIED POSTPROCEDURAL STATES: Chronic | ICD-10-CM

## 2022-06-30 DIAGNOSIS — N18.30 CHRONIC KIDNEY DISEASE, STAGE 3 UNSPECIFIED: ICD-10-CM

## 2022-06-30 DIAGNOSIS — C90.01 MULTIPLE MYELOMA IN REMISSION: ICD-10-CM

## 2022-06-30 DIAGNOSIS — Z79.4 LONG TERM (CURRENT) USE OF INSULIN: ICD-10-CM

## 2022-06-30 DIAGNOSIS — E83.42 HYPOMAGNESEMIA: ICD-10-CM

## 2022-06-30 DIAGNOSIS — N17.9 ACUTE KIDNEY FAILURE, UNSPECIFIED: ICD-10-CM

## 2022-06-30 DIAGNOSIS — R06.02 SHORTNESS OF BREATH: ICD-10-CM

## 2022-06-30 DIAGNOSIS — N40.0 BENIGN PROSTATIC HYPERPLASIA WITHOUT LOWER URINARY TRACT SYMPTOMS: ICD-10-CM

## 2022-06-30 DIAGNOSIS — Z98.49 CATARACT EXTRACTION STATUS, UNSPECIFIED EYE: Chronic | ICD-10-CM

## 2022-06-30 DIAGNOSIS — D63.8 ANEMIA IN OTHER CHRONIC DISEASES CLASSIFIED ELSEWHERE: ICD-10-CM

## 2022-06-30 DIAGNOSIS — U07.1 COVID-19: ICD-10-CM

## 2022-06-30 DIAGNOSIS — I25.2 OLD MYOCARDIAL INFARCTION: ICD-10-CM

## 2022-06-30 DIAGNOSIS — R77.8 OTHER SPECIFIED ABNORMALITIES OF PLASMA PROTEINS: ICD-10-CM

## 2022-06-30 DIAGNOSIS — I12.9 HYPERTENSIVE CHRONIC KIDNEY DISEASE WITH STAGE 1 THROUGH STAGE 4 CHRONIC KIDNEY DISEASE, OR UNSPECIFIED CHRONIC KIDNEY DISEASE: ICD-10-CM

## 2022-06-30 DIAGNOSIS — E11.22 TYPE 2 DIABETES MELLITUS WITH DIABETIC CHRONIC KIDNEY DISEASE: ICD-10-CM

## 2022-06-30 DIAGNOSIS — I25.10 ATHEROSCLEROTIC HEART DISEASE OF NATIVE CORONARY ARTERY WITHOUT ANGINA PECTORIS: ICD-10-CM

## 2022-06-30 DIAGNOSIS — Z79.82 LONG TERM (CURRENT) USE OF ASPIRIN: ICD-10-CM

## 2022-06-30 DIAGNOSIS — Z86.73 PERSONAL HISTORY OF TRANSIENT ISCHEMIC ATTACK (TIA), AND CEREBRAL INFARCTION WITHOUT RESIDUAL DEFICITS: ICD-10-CM

## 2022-06-30 LAB
ALBUMIN SERPL ELPH-MCNC: 3.5 G/DL — SIGNIFICANT CHANGE UP (ref 3.5–5.2)
ALP SERPL-CCNC: 98 U/L — SIGNIFICANT CHANGE UP (ref 30–115)
ALT FLD-CCNC: 32 U/L — SIGNIFICANT CHANGE UP (ref 0–41)
ANION GAP SERPL CALC-SCNC: 10 MMOL/L — SIGNIFICANT CHANGE UP (ref 7–14)
APPEARANCE UR: CLEAR — SIGNIFICANT CHANGE UP
AST SERPL-CCNC: 58 U/L — HIGH (ref 0–41)
BASOPHILS # BLD AUTO: 0.02 K/UL — SIGNIFICANT CHANGE UP (ref 0–0.2)
BASOPHILS NFR BLD AUTO: 0.4 % — SIGNIFICANT CHANGE UP (ref 0–1)
BILIRUB SERPL-MCNC: <0.2 MG/DL — SIGNIFICANT CHANGE UP (ref 0.2–1.2)
BILIRUB UR-MCNC: NEGATIVE — SIGNIFICANT CHANGE UP
BUN SERPL-MCNC: 39 MG/DL — HIGH (ref 10–20)
CALCIUM SERPL-MCNC: 8.9 MG/DL — SIGNIFICANT CHANGE UP (ref 8.5–10.1)
CHLORIDE SERPL-SCNC: 98 MMOL/L — SIGNIFICANT CHANGE UP (ref 98–110)
CO2 SERPL-SCNC: 22 MMOL/L — SIGNIFICANT CHANGE UP (ref 17–32)
COLOR SPEC: SIGNIFICANT CHANGE UP
CREAT ?TM UR-MCNC: 27 MG/DL — SIGNIFICANT CHANGE UP
CREAT SERPL-MCNC: 2 MG/DL — HIGH (ref 0.7–1.5)
CRP SERPL-MCNC: 13.3 MG/L — HIGH
D DIMER BLD IA.RAPID-MCNC: 450 NG/ML DDU — HIGH (ref 0–230)
DIFF PNL FLD: NEGATIVE — SIGNIFICANT CHANGE UP
EGFR: 31 ML/MIN/1.73M2 — LOW
EOSINOPHIL # BLD AUTO: 0.2 K/UL — SIGNIFICANT CHANGE UP (ref 0–0.7)
EOSINOPHIL NFR BLD AUTO: 3.7 % — SIGNIFICANT CHANGE UP (ref 0–8)
ERYTHROCYTE [SEDIMENTATION RATE] IN BLOOD: 30 MM/HR — HIGH (ref 0–10)
FLUAV AG NPH QL: SIGNIFICANT CHANGE UP
FLUBV AG NPH QL: SIGNIFICANT CHANGE UP
GLUCOSE BLDC GLUCOMTR-MCNC: 138 MG/DL — HIGH (ref 70–99)
GLUCOSE SERPL-MCNC: 160 MG/DL — HIGH (ref 70–99)
GLUCOSE UR QL: NEGATIVE — SIGNIFICANT CHANGE UP
HCT VFR BLD CALC: 30.8 % — LOW (ref 42–52)
HGB BLD-MCNC: 10.3 G/DL — LOW (ref 14–18)
IMM GRANULOCYTES NFR BLD AUTO: 0.6 % — HIGH (ref 0.1–0.3)
IRON SATN MFR SERPL: 13 % — LOW (ref 15–50)
IRON SATN MFR SERPL: 34 UG/DL — LOW (ref 35–150)
KETONES UR-MCNC: NEGATIVE — SIGNIFICANT CHANGE UP
LDH SERPL L TO P-CCNC: 166 U/L — SIGNIFICANT CHANGE UP (ref 50–242)
LEUKOCYTE ESTERASE UR-ACNC: NEGATIVE — SIGNIFICANT CHANGE UP
LYMPHOCYTES # BLD AUTO: 1.97 K/UL — SIGNIFICANT CHANGE UP (ref 1.2–3.4)
LYMPHOCYTES # BLD AUTO: 36.8 % — SIGNIFICANT CHANGE UP (ref 20.5–51.1)
MCHC RBC-ENTMCNC: 32.1 PG — HIGH (ref 27–31)
MCHC RBC-ENTMCNC: 33.4 G/DL — SIGNIFICANT CHANGE UP (ref 32–37)
MCV RBC AUTO: 96 FL — HIGH (ref 80–94)
MONOCYTES # BLD AUTO: 0.74 K/UL — HIGH (ref 0.1–0.6)
MONOCYTES NFR BLD AUTO: 13.8 % — HIGH (ref 1.7–9.3)
NEUTROPHILS # BLD AUTO: 2.39 K/UL — SIGNIFICANT CHANGE UP (ref 1.4–6.5)
NEUTROPHILS NFR BLD AUTO: 44.7 % — SIGNIFICANT CHANGE UP (ref 42.2–75.2)
NITRITE UR-MCNC: NEGATIVE — SIGNIFICANT CHANGE UP
NRBC # BLD: 0 /100 WBCS — SIGNIFICANT CHANGE UP (ref 0–0)
NT-PROBNP SERPL-SCNC: 716 PG/ML — HIGH (ref 0–300)
PH UR: 6 — SIGNIFICANT CHANGE UP (ref 5–8)
PLATELET # BLD AUTO: 134 K/UL — SIGNIFICANT CHANGE UP (ref 130–400)
POTASSIUM SERPL-MCNC: 5.2 MMOL/L — HIGH (ref 3.5–5)
POTASSIUM SERPL-SCNC: 5.2 MMOL/L — HIGH (ref 3.5–5)
POTASSIUM UR-SCNC: 14 MMOL/L — SIGNIFICANT CHANGE UP
PROT SERPL-MCNC: 5.1 G/DL — LOW (ref 6–8)
PROT UR-MCNC: NEGATIVE — SIGNIFICANT CHANGE UP
RBC # BLD: 3.21 M/UL — LOW (ref 4.7–6.1)
RBC # FLD: 14.3 % — SIGNIFICANT CHANGE UP (ref 11.5–14.5)
RSV RNA NPH QL NAA+NON-PROBE: SIGNIFICANT CHANGE UP
SARS-COV-2 RNA SPEC QL NAA+PROBE: DETECTED
SODIUM SERPL-SCNC: 130 MMOL/L — LOW (ref 135–146)
SODIUM UR-SCNC: 69 MMOL/L — SIGNIFICANT CHANGE UP
SP GR SPEC: 1.01 — LOW (ref 1.01–1.03)
TIBC SERPL-MCNC: 259 UG/DL — SIGNIFICANT CHANGE UP (ref 220–430)
TROPONIN T SERPL-MCNC: 0.02 NG/ML — HIGH
TROPONIN T SERPL-MCNC: 0.03 NG/ML — CRITICAL HIGH
UIBC SERPL-MCNC: 225 UG/DL — SIGNIFICANT CHANGE UP (ref 110–370)
UROBILINOGEN FLD QL: SIGNIFICANT CHANGE UP
UUN UR-MCNC: 165 MG/DL — SIGNIFICANT CHANGE UP
WBC # BLD: 5.35 K/UL — SIGNIFICANT CHANGE UP (ref 4.8–10.8)
WBC # FLD AUTO: 5.35 K/UL — SIGNIFICANT CHANGE UP (ref 4.8–10.8)

## 2022-06-30 PROCEDURE — 99285 EMERGENCY DEPT VISIT HI MDM: CPT

## 2022-06-30 PROCEDURE — 71045 X-RAY EXAM CHEST 1 VIEW: CPT | Mod: 26

## 2022-06-30 PROCEDURE — 93010 ELECTROCARDIOGRAM REPORT: CPT

## 2022-06-30 RX ORDER — ASCORBIC ACID 60 MG
1000 TABLET,CHEWABLE ORAL DAILY
Refills: 0 | Status: DISCONTINUED | OUTPATIENT
Start: 2022-06-30 | End: 2022-07-01

## 2022-06-30 RX ORDER — SIMVASTATIN 20 MG/1
10 TABLET, FILM COATED ORAL AT BEDTIME
Refills: 0 | Status: DISCONTINUED | OUTPATIENT
Start: 2022-06-30 | End: 2022-07-01

## 2022-06-30 RX ORDER — COLCHICINE 0.6 MG
0.3 TABLET ORAL DAILY
Refills: 0 | Status: DISCONTINUED | OUTPATIENT
Start: 2022-06-30 | End: 2022-07-01

## 2022-06-30 RX ORDER — HEPARIN SODIUM 5000 [USP'U]/ML
5000 INJECTION INTRAVENOUS; SUBCUTANEOUS EVERY 8 HOURS
Refills: 0 | Status: DISCONTINUED | OUTPATIENT
Start: 2022-06-30 | End: 2022-07-01

## 2022-06-30 RX ORDER — LEVOTHYROXINE SODIUM 125 MCG
25 TABLET ORAL DAILY
Refills: 0 | Status: DISCONTINUED | OUTPATIENT
Start: 2022-06-30 | End: 2022-07-01

## 2022-06-30 RX ORDER — CALCITRIOL 0.5 UG/1
0.5 CAPSULE ORAL DAILY
Refills: 0 | Status: DISCONTINUED | OUTPATIENT
Start: 2022-06-30 | End: 2022-07-01

## 2022-06-30 RX ORDER — BEBTELOVIMAB 87.5 MG/ML
175 INJECTION, SOLUTION INTRAVENOUS ONCE
Refills: 0 | Status: DISCONTINUED | OUTPATIENT
Start: 2022-06-30 | End: 2022-06-30

## 2022-06-30 RX ORDER — ASPIRIN/CALCIUM CARB/MAGNESIUM 324 MG
81 TABLET ORAL DAILY
Refills: 0 | Status: DISCONTINUED | OUTPATIENT
Start: 2022-06-30 | End: 2022-07-01

## 2022-06-30 RX ORDER — INSULIN LISPRO 100/ML
VIAL (ML) SUBCUTANEOUS
Refills: 0 | Status: DISCONTINUED | OUTPATIENT
Start: 2022-06-30 | End: 2022-07-01

## 2022-06-30 RX ORDER — ALLOPURINOL 300 MG
1 TABLET ORAL
Qty: 0 | Refills: 0 | DISCHARGE

## 2022-06-30 RX ORDER — LOSARTAN POTASSIUM 100 MG/1
1 TABLET, FILM COATED ORAL
Qty: 0 | Refills: 0 | DISCHARGE

## 2022-06-30 RX ORDER — LACTOBACILLUS ACIDOPHILUS 100MM CELL
1 CAPSULE ORAL DAILY
Refills: 0 | Status: DISCONTINUED | OUTPATIENT
Start: 2022-06-30 | End: 2022-07-01

## 2022-06-30 RX ORDER — COLCHICINE 0.6 MG
0 TABLET ORAL
Qty: 0 | Refills: 0 | DISCHARGE

## 2022-06-30 RX ORDER — COLCHICINE 0.6 MG
1 TABLET ORAL
Qty: 0 | Refills: 0 | DISCHARGE

## 2022-06-30 RX ORDER — INSULIN GLARGINE 100 [IU]/ML
35 INJECTION, SOLUTION SUBCUTANEOUS AT BEDTIME
Refills: 0 | Status: DISCONTINUED | OUTPATIENT
Start: 2022-06-30 | End: 2022-07-01

## 2022-06-30 RX ORDER — ALLOPURINOL 300 MG
100 TABLET ORAL DAILY
Refills: 0 | Status: DISCONTINUED | OUTPATIENT
Start: 2022-06-30 | End: 2022-07-01

## 2022-06-30 RX ORDER — MECLIZINE HCL 12.5 MG
1 TABLET ORAL
Qty: 0 | Refills: 0 | DISCHARGE

## 2022-06-30 RX ORDER — ACETAMINOPHEN 500 MG
650 TABLET ORAL EVERY 6 HOURS
Refills: 0 | Status: DISCONTINUED | OUTPATIENT
Start: 2022-06-30 | End: 2022-07-01

## 2022-06-30 RX ORDER — LOSARTAN POTASSIUM 100 MG/1
50 TABLET, FILM COATED ORAL DAILY
Refills: 0 | Status: DISCONTINUED | OUTPATIENT
Start: 2022-06-30 | End: 2022-07-01

## 2022-06-30 RX ORDER — METOPROLOL TARTRATE 50 MG
50 TABLET ORAL DAILY
Refills: 0 | Status: DISCONTINUED | OUTPATIENT
Start: 2022-06-30 | End: 2022-07-01

## 2022-06-30 RX ORDER — SODIUM CHLORIDE 9 MG/ML
1000 INJECTION, SOLUTION INTRAVENOUS
Refills: 0 | Status: DISCONTINUED | OUTPATIENT
Start: 2022-06-30 | End: 2022-07-01

## 2022-06-30 RX ORDER — CHLORHEXIDINE GLUCONATE 213 G/1000ML
1 SOLUTION TOPICAL
Refills: 0 | Status: DISCONTINUED | OUTPATIENT
Start: 2022-06-30 | End: 2022-07-01

## 2022-06-30 RX ORDER — FUROSEMIDE 40 MG
1 TABLET ORAL
Qty: 0 | Refills: 0 | DISCHARGE

## 2022-06-30 RX ADMIN — INSULIN GLARGINE 35 UNIT(S): 100 INJECTION, SOLUTION SUBCUTANEOUS at 22:02

## 2022-06-30 RX ADMIN — SIMVASTATIN 10 MILLIGRAM(S): 20 TABLET, FILM COATED ORAL at 22:04

## 2022-06-30 RX ADMIN — HEPARIN SODIUM 5000 UNIT(S): 5000 INJECTION INTRAVENOUS; SUBCUTANEOUS at 22:04

## 2022-06-30 NOTE — ED ADULT TRIAGE NOTE - CHIEF COMPLAINT QUOTE
Patient presents to ED c/o shortness of breath. Daughter states that patient has fluid in his lungs and tested positive for COVID yesterday.

## 2022-06-30 NOTE — ED ADULT NURSE NOTE - INTERVENTIONS DEFINITIONS
[place] to call system/Call bell, personal items and telephone within reach/Review medications for side effects contributing to fall risk/Reinforce activity limits and safety measures with patient and family/Provide visual clues: red socks

## 2022-06-30 NOTE — ED PROVIDER NOTE - CLINICAL SUMMARY MEDICAL DECISION MAKING FREE TEXT BOX
89-year-old male past medical history of hypertension, CAD with prior MI, TIA, multiple myeloma in remission, diabetes, BPH, hypothyroidism who presents to the emergency department with cough, shortness of breath, weakness, low-grade fever with symptoms starting 2 to 3 days ago.  Patient had positive COVID exposure with family having COVID.  Patient compliant with home medications but did not take anything else for COVID treatment.  Daughter set up monoclonal antibody infusion for tomorrow for patient.  Patient, daughter also report that patient has had medication adjustments recently and had worsening kidney function and doctors went down on his water pill.  No falls or recent injuries.  Patient was not on supplemental oxygen at home.    On exam, vital signs reviewed.  Patient is appropriate for age.  Neuro exam nonfocal.  O2 sat 98%.  IV placed, labs sent, chest x-ray performed.  Patient has worsening kidney function with TI.  Patient is mildly elevated troponin.  ED work-up reviewed and patient to be admitted overnight for symptoms and elevated troponin.  ID consulted and will try to give monoclonal antibody infusion.  Patient consented for this therapy.  BNP also elevated to 700.  Chest x-ray does not show infiltrate for possible vascular congestion.  Will admit for further work-up and management.    ED work up reviewed and results and plan of care discussed with patient. Patient requires admission for further work up, monitoring, and management. Need for admission discussed with patient.

## 2022-06-30 NOTE — ED PROVIDER NOTE - PHYSICAL EXAMINATION
CONSTITUTIONAL: Well-developed; well-nourished; in no acute distress.   SKIN: warm, dry  HEAD: Normocephalic; atraumatic.  EYES: no conjunctival injection  ENT: No nasal discharge; airway clear.  NECK: Supple  CARD: S1, S2 normal;  Regular rate and rhythm.   RESP: No wheezes, rales or rhonchi. Saturating 96% on room air at rest.  ABD: soft ntnd  EXT: Normal ROM. +1 b/l LE pitting edema  NEURO: Alert, oriented, grossly unremarkable.  PSYCH: Cooperative, appropriate.

## 2022-06-30 NOTE — PATIENT PROFILE ADULT - FALL HARM RISK - HARM RISK INTERVENTIONS

## 2022-06-30 NOTE — H&P ADULT - HISTORY OF PRESENT ILLNESS
PMHx of multiple MI, TIA, HTN, MM in remission, on ASA and Plavix, DM, Gout, BPPV, BPH, Hypomagnesia , hypothyroidism, presents with shortness of breat, weakness, lightheadedness, low grade fevers 100.4 x 2-3 weeks....     Reports recent covid exposure.   Covid vaccinated:     In ED: T 99F, HR 68, /63, P7% on RA. Labs significant for Hgb 10.3 (bl: ), Cr 2.0 (bl: ), Trop 0.03, .   EKG: NSR   Chest Xray: No consolidation. Vascular congestion noted.   Covid:      89 yr old man with PMHx of multiple MI, TIA, HTN, MM in remission, on ASA, DM, Gout, BPPV, BPH, Hypomagnesia , hypothyroidism, presents with shortness of breath, weakness, lightheadedness for 2-3 weaks and positive covid test 2 days ago. Per daughter at bedside, family member from [place] was staying with them for the past week who developed symptosm 5 days ago, tested positive for Covid 3 days ago. Patient developed low grade fevers 100.4 at home the past day, endorses cough, and sore throat. Denies weight loss, chills, rhinorrhea, N/V/C/D. Patient is not vaccinated. Daughter also states that patient has hx of CKD, and was told by nephrologist to take lasix 20mg every other day. last dose today am.     In ED: T 99F, HR 68, /63, P7% on RA. Labs significant for Hgb 10.3 (bl: 10 ), Cr 2.0 (bl: 1.7-2.0 ), Trop 0.03, .   EKG: NSR   Chest Xray: No consolidation. Vascular congestion noted.   Covid: Positive outpatient (rapid and pcr)

## 2022-06-30 NOTE — PATIENT PROFILE ADULT - DO YOU FEEL LIKE HURTING YOURSELF OR OTHERS?
Render Post-Care Instructions In Note?: no
Consent: The patient's consent was obtained including but not limited to risks of crusting, scabbing, blistering, scarring, darker or lighter pigmentary change, recurrence, incomplete removal and infection.
Duration Of Freeze Thaw-Cycle (Seconds): 0
Post-Care Instructions: I reviewed with the patient in detail post-care instructions. Patient is to wear sunprotection, and avoid picking at any of the treated lesions. Pt may apply Vaseline to crusted or scabbing areas.
Detail Level: Detailed
no

## 2022-06-30 NOTE — ED ADULT NURSE NOTE - NSICDXPASTMEDICALHX_GEN_ALL_CORE_FT
PAST MEDICAL HISTORY:  Acute myocardial infarction     DM (diabetes mellitus)     Gout     H/O vertigo     History of TIAs left inner ear damage    [place] (hard of hearing)     HTN (hypertension)     Hypothyroidism as per pt's daughter my dad had his thyroid levels drawn  within 2-3 months- the medication dose remained the same    Multiple myeloma

## 2022-06-30 NOTE — H&P ADULT - ASSESSMENT
PMHx of multiple MI, TIA, HTN, MM in remission, on ASA and Plavix, DM, Gout, BPPV, BPH, Hypomagnesia , hypothyroidism, presents with shortness of breath, weakness, lightheadedness, low grade fevers 100.4 x 2-3 week    #Sob, weakness, reported hypoxia at home x2 weeks   #Recent Covid exposure        #TI on CKD     #Hx of MI/ CAD    #DM    #Gout    #Hypothyroid    #Hx of hypomagnesia     #BPPV    #BPH    #MM in remission   #Anemia     #Misc  - GI prophylaxis  - DVT prophylaxis  - Diet:  - Code status  - Dispo: acute,      89 yr old man with PMHx of multiple MI, TIA, HTN, MM in remission, on ASA, DM, Gout, BPPV, BPH, Hypomagnesia , hypothyroidism, presents with shortness of breath, weakness, lightheadedness for 2-3 weaks and positive covid test 2 days ago.     #Mild-Mod Covid   - low grade fever, sore throat, cough, x2-3 days, weakness x2-3 weaks, Covid positive at outpatient, reported 88% at home   - Unvaccinated   - Afebrile, 98%RA in ED, vitals stable  - Chest Xray: mild congestion, no pneumonia   - F/U inflammatory markers (procalc/ esr, crp/ ferritin/ ldh/ d-dimer)  - no need for steroid or RDV (also has TI)   - ID consult for mab to prevent progression   - C/w isolation airborne and contact   - Tylenol for weakness/ fevers  - PT consult     #CKD stage Stage III  - Baseline 1.7-2.0  in HIE, Cr 2.0 on admission GFR 31   - at baseline baseline   - Nephrology Dr. King  - F/U UA and urine lytes  - F/U bladder scan x1 since patient has hx of BPH     #Elevated trops  #Hx of MI/ CAD  - c/w aspirin, no longer on plavix   - Follows with Dr. Serna  - No active chest pain, trace edema on exam   - trop x1 0.03  - EKG NSR  - Echo not on file   - trend trops and EKG to rule out ACS   - Likely elevated in the setting of CKD. Less likely ACS  - Cardiologist: Dr. Serna     #DM  - f/u a1c  - On home tresiba 35-45 units and lispro prn TID  - Monitor FS. Start lantus 35 + sliding. Start TID dosing if consistently elevated FS.     #Gout  - c/w colchicine, renal dose given TI  - C/w allopurinol     #Hypothyroidism  - F/U TSH  - C/w synthroid     #Hx of hypomagnesia   - on home magnesium 500mg qd  - check magnesium. Can replete IV as needed     #BPPV  - No symptoms at this time  - Patient no longer taking meclizine    #BPH  - not on flomax, not complaining of symptoms     #MM in remission  #Anemia of chronic disease  - F/U outpatient  - Hgb stable at baseline 10     #Misc  - GI prophylaxis: not needed   - DVT prophylaxis: heparin  - Diet: soft and bite sized, dash/ carb consistent   - Code status  - Dispo: acute, from home

## 2022-06-30 NOTE — ED PROVIDER NOTE - CARE PLAN
1 Principal Discharge DX:	Weakness  Secondary Diagnosis:	Elevated troponin  Secondary Diagnosis:	TI (acute kidney injury)  Secondary Diagnosis:	Exposure to COVID-19 virus

## 2022-06-30 NOTE — ED PROVIDER NOTE - OBJECTIVE STATEMENT
89 yo male, PMHx of multiple MI, TIA, HTN, MM in remission, on ASA and Plavix, presents with generalized weakness and lightheadedness x2-3 weeks, no alleviating or aggravating factors, associated with low grade fevers (100.4 tmax). Patient recently had Covid+ exposure and tested positive at home yesterday. Per patient's daughter, patient's O2 saturation at home has been as low as 88% at rest. Cardiologist Dr. Serna. Denies nausea, vomiting, abdominal pain, chest pain, shortness of breath. 89 yo male, PMHx of multiple MI, TIA, HTN, MM in remission, on ASA and Plavix, DM, presents with generalized weakness and lightheadedness x2-3 weeks, no alleviating or aggravating factors, associated with low grade fevers (100.4 tmax). Patient recently had Covid+ exposure and tested positive at home yesterday. Per patient's daughter, patient's O2 saturation at home has been as low as 88% at rest. Cardiologist Dr. Serna. Denies nausea, vomiting, abdominal pain, chest pain, shortness of breath.

## 2022-06-30 NOTE — ED PROVIDER NOTE - NS ED ROS FT
GEN:  +fever, no chills, +generalized weakness  NEURO:  no headache, +lightheadedness  ENT: +sore throat, no runny nose  CV:  no chest pain, no palpitations  RESP:  no sob, no cough  GI:  no nausea, no vomiting, no abdominal pain, no diarrhea  :  no dysuria, no urinary frequency, no hematuria  MSK:  no joint pain, no edema  SKIN:  no rash, no bruising

## 2022-06-30 NOTE — ED ADULT NURSE REASSESSMENT NOTE - NS ED NURSE REASSESS COMMENT FT1
awaiting on pharmacy for administration of covid medication  as per doc, ID approval needed. MD made aware

## 2022-06-30 NOTE — ED ADULT NURSE NOTE - OBJECTIVE STATEMENT
seen in urgent care and sent to the ed for fluid in lung and hypoxia in the 80,  noted at 98 on RA in the ED seen in urgent care and sent to the ed for fluid in lung and hypoxia in the 80,  noted at 98 on RA in the ED  patient with insulin monitor in LUQ. not an insulin pump as per daughter

## 2022-06-30 NOTE — H&P ADULT - NSHPOUTPATIENTPROVIDERS_GEN_ALL_CORE
Cardiology: Dr. Serna Cardiology: Dr. Serna  Endocrinologist: Dr. Kirkpatrick  Nephrology: Dr. Duque  Primary: Dr. Bustillo

## 2022-06-30 NOTE — ED ADULT NURSE REASSESSMENT NOTE - NS ED NURSE REASSESS COMMENT FT1
patient reassessed, vs wnl. no distress noted. still maintaining oxygen level on RA. provided with urinal to urinate

## 2022-06-30 NOTE — ED PROVIDER NOTE - ATTENDING CONTRIBUTION TO CARE
I personally evaluated patient. I agree with the findings and plan with all documentation on chart except as documented  in my note.    89-year-old male past medical history of hypertension, CAD with prior MI, TIA, multiple myeloma in remission, diabetes, BPH, hypothyroidism who presents to the emergency department with cough, shortness of breath, weakness, low-grade fever with symptoms starting 2 to 3 days ago.  Patient had positive COVID exposure with family having COVID.  Patient compliant with home medications but did not take anything else for COVID treatment.  Daughter set up monoclonal antibody infusion for tomorrow for patient.  Patient, daughter also report that patient has had medication adjustments recently and had worsening kidney function and doctors went down on his water pill.  No falls or recent injuries.  Patient was not on supplemental oxygen at home.    On exam, vital signs reviewed.  Patient is appropriate for age.  Neuro exam nonfocal.  O2 sat 98%.  IV placed, labs sent, chest x-ray performed.  Patient has worsening kidney function with TI.  Patient is mildly elevated troponin.  ED work-up reviewed and patient to be admitted overnight for symptoms and elevated troponin.  ID consulted and will try to give monoclonal antibody infusion.  Patient consented for this therapy.  BNP also elevated to 700.  Chest x-ray does not show infiltrate for possible vascular congestion.  Will admit for further work-up and management.    ED work up reviewed and results and plan of care discussed with patient. Patient requires admission for further work up, monitoring, and management. Need for admission discussed with patient.

## 2022-06-30 NOTE — H&P ADULT - NSICDXPASTMEDICALHX_GEN_ALL_CORE_FT
PAST MEDICAL HISTORY:  Acute myocardial infarction     DM (diabetes mellitus)     Gout     H/O vertigo     History of TIAs left inner ear damage    [place] (hard of hearing)     HTN (hypertension)     Hypothyroidism as per pt's daughter my dad had his thyroid levels drawn  within 2-3 months- the medication dose remained the same    Multiple myeloma

## 2022-06-30 NOTE — ED ADULT TRIAGE NOTE - NS ED TRIAGE AVPU SCALE
no Alert-The patient is alert, awake and responds to voice. The patient is oriented to time, place, and person. The triage nurse is able to obtain subjective information.

## 2022-06-30 NOTE — H&P ADULT - NSHPPHYSICALEXAM_GEN_ALL_CORE
GENERAL: NAD, well-developed, AAOx3  HEENT:  Atraumatic, Normocephalic. EOMI, PERRLA, conjunctiva and sclera clear, No JVD  PULMONARY: Clear to auscultation bilaterally; No wheeze  CARDIOVASCULAR: Regular rate and rhythm; No murmurs, rubs, or gallops  GASTROINTESTINAL: Soft, Nontender, Nondistended; Bowel sounds present  MUSCULOSKELETAL:  2+ Peripheral Pulses, No clubbing, cyanosis, +1 edema lower extremity   NEUROLOGY: non-focal  SKIN: No rashes or lesions

## 2022-07-01 ENCOUNTER — TRANSCRIPTION ENCOUNTER (OUTPATIENT)
Age: 87
End: 2022-07-01

## 2022-07-01 ENCOUNTER — APPOINTMENT (OUTPATIENT)
Age: 87
End: 2022-07-01

## 2022-07-01 VITALS
RESPIRATION RATE: 18 BRPM | SYSTOLIC BLOOD PRESSURE: 119 MMHG | DIASTOLIC BLOOD PRESSURE: 57 MMHG | TEMPERATURE: 98 F | HEART RATE: 57 BPM

## 2022-07-01 LAB
A1C WITH ESTIMATED AVERAGE GLUCOSE RESULT: 6.4 % — HIGH (ref 4–5.6)
ALBUMIN SERPL ELPH-MCNC: 3.3 G/DL — LOW (ref 3.5–5.2)
ALP SERPL-CCNC: 97 U/L — SIGNIFICANT CHANGE UP (ref 30–115)
ALT FLD-CCNC: 25 U/L — SIGNIFICANT CHANGE UP (ref 0–41)
ANION GAP SERPL CALC-SCNC: 10 MMOL/L — SIGNIFICANT CHANGE UP (ref 7–14)
AST SERPL-CCNC: 38 U/L — SIGNIFICANT CHANGE UP (ref 0–41)
BASOPHILS # BLD AUTO: 0.02 K/UL — SIGNIFICANT CHANGE UP (ref 0–0.2)
BASOPHILS NFR BLD AUTO: 0.4 % — SIGNIFICANT CHANGE UP (ref 0–1)
BILIRUB SERPL-MCNC: <0.2 MG/DL — SIGNIFICANT CHANGE UP (ref 0.2–1.2)
BUN SERPL-MCNC: 39 MG/DL — HIGH (ref 10–20)
CALCIUM SERPL-MCNC: 9 MG/DL — SIGNIFICANT CHANGE UP (ref 8.5–10.1)
CHLORIDE SERPL-SCNC: 104 MMOL/L — SIGNIFICANT CHANGE UP (ref 98–110)
CHOLEST SERPL-MCNC: 80 MG/DL — SIGNIFICANT CHANGE UP
CO2 SERPL-SCNC: 25 MMOL/L — SIGNIFICANT CHANGE UP (ref 17–32)
CREAT SERPL-MCNC: 2 MG/DL — HIGH (ref 0.7–1.5)
EGFR: 31 ML/MIN/1.73M2 — LOW
EOSINOPHIL # BLD AUTO: 0.36 K/UL — SIGNIFICANT CHANGE UP (ref 0–0.7)
EOSINOPHIL NFR BLD AUTO: 7.1 % — SIGNIFICANT CHANGE UP (ref 0–8)
ESTIMATED AVERAGE GLUCOSE: 137 MG/DL — HIGH (ref 68–114)
FERRITIN SERPL-MCNC: 91 NG/ML — SIGNIFICANT CHANGE UP (ref 30–400)
FOLATE SERPL-MCNC: >20 NG/ML — SIGNIFICANT CHANGE UP
GLUCOSE BLDC GLUCOMTR-MCNC: 129 MG/DL — HIGH (ref 70–99)
GLUCOSE BLDC GLUCOMTR-MCNC: 154 MG/DL — HIGH (ref 70–99)
GLUCOSE BLDC GLUCOMTR-MCNC: 83 MG/DL — SIGNIFICANT CHANGE UP (ref 70–99)
GLUCOSE SERPL-MCNC: 91 MG/DL — SIGNIFICANT CHANGE UP (ref 70–99)
HCT VFR BLD CALC: 31.9 % — LOW (ref 42–52)
HDLC SERPL-MCNC: 31 MG/DL — LOW
HGB BLD-MCNC: 10.4 G/DL — LOW (ref 14–18)
IMM GRANULOCYTES NFR BLD AUTO: 0.6 % — HIGH (ref 0.1–0.3)
LIPID PNL WITH DIRECT LDL SERPL: 25 MG/DL — SIGNIFICANT CHANGE UP
LYMPHOCYTES # BLD AUTO: 2.47 K/UL — SIGNIFICANT CHANGE UP (ref 1.2–3.4)
LYMPHOCYTES # BLD AUTO: 48.4 % — SIGNIFICANT CHANGE UP (ref 20.5–51.1)
MAGNESIUM SERPL-MCNC: 1.5 MG/DL — LOW (ref 1.8–2.4)
MCHC RBC-ENTMCNC: 31.4 PG — HIGH (ref 27–31)
MCHC RBC-ENTMCNC: 32.6 G/DL — SIGNIFICANT CHANGE UP (ref 32–37)
MCV RBC AUTO: 96.4 FL — HIGH (ref 80–94)
MONOCYTES # BLD AUTO: 0.55 K/UL — SIGNIFICANT CHANGE UP (ref 0.1–0.6)
MONOCYTES NFR BLD AUTO: 10.8 % — HIGH (ref 1.7–9.3)
NEUTROPHILS # BLD AUTO: 1.67 K/UL — SIGNIFICANT CHANGE UP (ref 1.4–6.5)
NEUTROPHILS NFR BLD AUTO: 32.7 % — LOW (ref 42.2–75.2)
NON HDL CHOLESTEROL: 49 MG/DL — SIGNIFICANT CHANGE UP
NRBC # BLD: 0 /100 WBCS — SIGNIFICANT CHANGE UP (ref 0–0)
PLATELET # BLD AUTO: 142 K/UL — SIGNIFICANT CHANGE UP (ref 130–400)
POTASSIUM SERPL-MCNC: 4.7 MMOL/L — SIGNIFICANT CHANGE UP (ref 3.5–5)
POTASSIUM SERPL-SCNC: 4.7 MMOL/L — SIGNIFICANT CHANGE UP (ref 3.5–5)
PROCALCITONIN SERPL-MCNC: 0.34 NG/ML — HIGH (ref 0.02–0.1)
PROT SERPL-MCNC: 5.1 G/DL — LOW (ref 6–8)
RBC # BLD: 3.31 M/UL — LOW (ref 4.7–6.1)
RBC # FLD: 14.3 % — SIGNIFICANT CHANGE UP (ref 11.5–14.5)
SODIUM SERPL-SCNC: 139 MMOL/L — SIGNIFICANT CHANGE UP (ref 135–146)
TRIGL SERPL-MCNC: 122 MG/DL — SIGNIFICANT CHANGE UP
TROPONIN T SERPL-MCNC: 0.02 NG/ML — HIGH
TSH SERPL-MCNC: 5.07 UIU/ML — HIGH (ref 0.27–4.2)
VIT B12 SERPL-MCNC: 619 PG/ML — SIGNIFICANT CHANGE UP (ref 232–1245)
WBC # BLD: 5.1 K/UL — SIGNIFICANT CHANGE UP (ref 4.8–10.8)
WBC # FLD AUTO: 5.1 K/UL — SIGNIFICANT CHANGE UP (ref 4.8–10.8)

## 2022-07-01 PROCEDURE — 99236 HOSP IP/OBS SAME DATE HI 85: CPT

## 2022-07-01 RX ORDER — MAGNESIUM SULFATE 500 MG/ML
2 VIAL (ML) INJECTION
Refills: 0 | Status: COMPLETED | OUTPATIENT
Start: 2022-07-01 | End: 2022-07-01

## 2022-07-01 RX ORDER — BEBTELOVIMAB 87.5 MG/ML
175 INJECTION, SOLUTION INTRAVENOUS ONCE
Refills: 0 | Status: COMPLETED | OUTPATIENT
Start: 2022-07-01 | End: 2022-07-01

## 2022-07-01 RX ORDER — FUROSEMIDE 40 MG
1 TABLET ORAL
Qty: 0 | Refills: 0 | DISCHARGE

## 2022-07-01 RX ORDER — MAGNESIUM SULFATE 500 MG/ML
2 VIAL (ML) INJECTION
Qty: 20 | Refills: 0 | Status: DISCONTINUED | OUTPATIENT
Start: 2022-07-01 | End: 2022-07-01

## 2022-07-01 RX ADMIN — Medication 1 TABLET(S): at 12:10

## 2022-07-01 RX ADMIN — Medication 25 MICROGRAM(S): at 05:58

## 2022-07-01 RX ADMIN — Medication 0.3 MILLIGRAM(S): at 12:01

## 2022-07-01 RX ADMIN — Medication 25 GRAM(S): at 12:03

## 2022-07-01 RX ADMIN — BEBTELOVIMAB 175 MILLIGRAM(S): 87.5 INJECTION, SOLUTION INTRAVENOUS at 15:48

## 2022-07-01 RX ADMIN — Medication 1: at 16:48

## 2022-07-01 RX ADMIN — LOSARTAN POTASSIUM 50 MILLIGRAM(S): 100 TABLET, FILM COATED ORAL at 05:58

## 2022-07-01 RX ADMIN — Medication 100 MILLIGRAM(S): at 12:04

## 2022-07-01 RX ADMIN — CALCITRIOL 0.5 MICROGRAM(S): 0.5 CAPSULE ORAL at 12:02

## 2022-07-01 RX ADMIN — Medication 50 MILLIGRAM(S): at 05:58

## 2022-07-01 RX ADMIN — Medication 25 GRAM(S): at 10:53

## 2022-07-01 RX ADMIN — HEPARIN SODIUM 5000 UNIT(S): 5000 INJECTION INTRAVENOUS; SUBCUTANEOUS at 05:57

## 2022-07-01 RX ADMIN — Medication 1000 MILLIGRAM(S): at 11:51

## 2022-07-01 RX ADMIN — Medication 81 MILLIGRAM(S): at 11:53

## 2022-07-01 RX ADMIN — Medication 1 TABLET(S): at 12:03

## 2022-07-01 NOTE — CONSULT NOTE ADULT - ASSESSMENT
ASSESSMENT  89 yr old man with PMHx of multiple MI, TIA, HTN, MM in remission, on ASA, DM, Gout, BPPV, BPH, Hypomagnesia , hypothyroidism, presents with shortness of breath, weakness, lightheadedness for 2-3 weaks and positive covid test 2 days ago.     IMPRESSION  #COVID19, mild  unvaccinated  #Sepsis ruled out on admission   #MM  #CKD  Creatinine, Serum: 2.0 (07-01-22 @ 05:46)    Weight (kg): 81.6 (06-30-22 @ 11:23)    RECOMMENDATIONS  Bebtelovimab infusion recommended   Recall ID [id number]    If any questions, please call or send a message on [facility] Teams  Please continue to update ID with any pertinent new laboratory or radiographic findings  Spectra [id number]

## 2022-07-01 NOTE — PROGRESS NOTE ADULT - SUBJECTIVE AND OBJECTIVE BOX
SUBJECTIVE:  HPI:  89 yr old man with PMHx of multiple MI, TIA, HTN, MM in remission, on ASA, DM, Gout, BPPV, BPH, Hypomagnesia , hypothyroidism, presents with shortness of breath, weakness, lightheadedness for 2-3 weaks and positive covid test 2 days ago. Per daughter at bedside, family member from [place] was staying with them for the past week who developed symptosm 5 days ago, tested positive for Covid 3 days ago. Patient developed low grade fevers 100.4 at home the past day, endorses cough, and sore throat. Denies weight loss, chills, rhinorrhea, N/V/C/D. Patient is not vaccinated. Daughter also states that patient has hx of CKD, and was told by nephrologist to take lasix 20mg every other day. last dose today am.     In ED: T 99F, HR 68, /63, P7% on RA. Labs significant for Hgb 10.3 (bl: 10 ), Cr 2.0 (bl: 1.7-2.0 ), Trop 0.03, .   EKG: NSR   Chest Xray: No consolidation. Vascular congestion noted.   Covid: Positive outpatient (rapid and pcr)      (2022 15:06)      Patient is a 89y old Male who presents with a chief complaint of fever, shortness of breath, covid exposure (2022 15:06)    Currently admitted to medicine with the primary diagnosis of Weakness       Today is hospital day 1d.     PAST MEDICAL & SURGICAL HISTORY  DM (diabetes mellitus)    H/O vertigo    Acute myocardial infarction    History of TIAs  left inner ear damage    Gout    Hypothyroidism  as per pt&#x27;s daughter my dad had his thyroid levels drawn  within 2-3 months- the medication dose remained the same    Multiple myeloma    [place] (hard of hearing)    HTN (hypertension)    H/O carotid endarterectomy    H/O cataract extraction    History of rectal polypectomy        ALLERGIES:  No Known Allergies    MEDICATIONS:  ACTIVE MEDICATIONS  acetaminophen     Tablet .. 650 milliGRAM(s) Oral every 6 hours PRN  allopurinol 100 milliGRAM(s) Oral daily  ascorbic acid  Oral Tab/Cap - Peds 1000 milliGRAM(s) Oral daily  aspirin enteric coated 81 milliGRAM(s) Oral daily  calcitriol   Capsule 0.5 MICROGram(s) Oral daily  chlorhexidine 4% Liquid 1 Application(s) Topical <User Schedule>  colchicine 0.3 milliGRAM(s) Oral daily  dextrose 5%. 1000 milliLiter(s) IV Continuous <Continuous>  heparin   Injectable 5000 Unit(s) SubCutaneous every 8 hours  insulin glargine Injectable (LANTUS) 35 Unit(s) SubCutaneous at bedtime  insulin lispro (ADMELOG) corrective regimen sliding scale   SubCutaneous three times a day before meals  lactobacillus acidophilus 1 Tablet(s) Oral daily  levothyroxine 25 MICROGram(s) Oral daily  losartan 50 milliGRAM(s) Oral daily  magnesium sulfate  IVPB 2 Gram(s) IV Intermittent every 2 hours  metoprolol succinate ER 50 milliGRAM(s) Oral daily  multivitamin 1 Tablet(s) Oral daily  simvastatin 10 milliGRAM(s) Oral at bedtime      VITALS:   T(F): 98.5  HR: 59  BP: 125/58  RR: 18  SpO2: 97%    LABS:                        10.4   5.10  )-----------( 142      ( 2022 05:46 )             31.9     07-    139  |  104  |  39<H>  ----------------------------<  91  4.7   |  25  |  2.0<H>    Ca    9.0      2022 05:46  Mg     1.5     07-    TPro  5.1<L>  /  Alb  3.3<L>  /  TBili  <0.2  /  DBili  x   /  AST  38  /  ALT  25  /  AlkPhos  97  07-      Urinalysis Basic - ( 2022 17:33 )    Color: Light Yellow / Appearance: Clear / S.007 / pH: x  Gluc: x / Ketone: Negative  / Bili: Negative / Urobili: <2 mg/dL   Blood: x / Protein: Negative / Nitrite: Negative   Leuk Esterase: Negative / RBC: x / WBC x   Sq Epi: x / Non Sq Epi: x / Bacteria: x        Troponin T, Serum: 0.02 ng/mL *H* (22 @ 22:42)  Troponin T, Serum: 0.02 ng/mL *H* (22 @ 17:26)  Sedimentation Rate, Erythrocyte: 30 mm/Hr *H* (22 @ 17:26)  Troponin T, Serum: 0.03 ng/mL *HH* (22 @ 12:13)      CARDIAC MARKERS ( 2022 22:42 )  x     / 0.02 ng/mL / x     / x     / x      CARDIAC MARKERS ( 2022 17:26 )  x     / 0.02 ng/mL / x     / x     / x      CARDIAC MARKERS ( 2022 12:13 )  x     / 0.03 ng/mL / x     / x     / x              PHYSICAL EXAM:  GEN: Lying in bed comfortably. No acute distress  LUNGS: Mild wheezing heard on auscultation bilaterally.  HEART: Regular rate and rhythm. S1/S2 present. No murmurs, rubs, or gallops.    ABD: Soft, non-tender, non-distended.   EXT: No pedal edema, warm to touch, no discoloration  NEURO: AAOX3

## 2022-07-01 NOTE — PHYSICAL THERAPY INITIAL EVALUATION ADULT - ADDITIONAL COMMENTS
Pt lives in  with daughter and family with 1 SUZIE, 0 steps to bed/bath. Previously required RW occasionally outside when ambulating community distances. Pt did not use AD in house, and his daughter helps pt with light duties around the house with laundry etc.

## 2022-07-01 NOTE — DISCHARGE NOTE NURSING/CASE MANAGEMENT/SOCIAL WORK - NSDCPEFALRISK_GEN_ALL_CORE
For information on Fall & Injury Prevention, visit: [email or web address] OR  [email or web address] OR  https://www.cdc.gov/steadi/patient.html

## 2022-07-01 NOTE — DISCHARGE NOTE PROVIDER - CARE PROVIDER_API CALL
Baldemar Bustillo (DO)  Infectious Disease; Internal Medicine  [address] [place] [id number]  Phone: [phone]  Fax: [phone]  Follow Up Time: 2 weeks

## 2022-07-01 NOTE — PHYSICAL THERAPY INITIAL EVALUATION ADULT - PERTINENT HX OF CURRENT PROBLEM, REHAB EVAL
89 yr old man with PMHx of multiple MI, TIA, HTN, MM in remission, on ASA, DM, Gout, BPPV, BPH, Hypomagnesia , hypothyroidism, presents with shortness of breath, weakness, lightheadedness for 2-3 weaks and positive covid test 2 days ago. Per daughter at bedside, family member from [place] was staying with them for the past week who developed symptoms 5 days ago, tested positive for Covid 3 days ago.

## 2022-07-01 NOTE — CONSULT NOTE ADULT - SUBJECTIVE AND OBJECTIVE BOX
KUN GLASER  89y, Male  Allergy: No Known Allergies      CHIEF COMPLAINT:   fever, shortness of breath, covid exposure (2022 11:19)      LOS  1d    HPI  HPI:  89 yr old man with PMHx of multiple MI, TIA, HTN, MM in remission, on ASA, DM, Gout, BPPV, BPH, Hypomagnesia , hypothyroidism, presents with shortness of breath, weakness, lightheadedness for 2-3 weaks and positive covid test 2 days ago. Per daughter at bedside, family member from [place] was staying with them for the past week who developed symptosm 5 days ago, tested positive for Covid 3 days ago. Patient developed low grade fevers 100.4 at home the past day, endorses cough, and sore throat. Denies weight loss, chills, rhinorrhea, N/V/C/D. Patient is not vaccinated. Daughter also states that patient has hx of CKD, and was told by nephrologist to take lasix 20mg every other day. last dose today am.     In ED: T 99F, HR 68, /63, P7% on RA. Labs significant for Hgb 10.3 (bl: 10 ), Cr 2.0 (bl: 1.7-2.0 ), Trop 0.03, .   EKG: NSR   Chest Xray: No consolidation. Vascular congestion noted.   Covid: Positive outpatient (rapid and pcr)      (2022 15:06)      INFECTIOUS DISEASE HISTORY:  ID consulted for COVID19 satting well on RA  Unvaccinated    PMH  PAST MEDICAL & SURGICAL HISTORY:  DM (diabetes mellitus)      H/O vertigo      Acute myocardial infarction      History of TIAs  left inner ear damage      Gout      Hypothyroidism  as per pt&#x27;s daughter my dad had his thyroid levels drawn  within 2-3 months- the medication dose remained the same      Multiple myeloma      [place] (hard of hearing)      HTN (hypertension)      H/O carotid endarterectomy      H/O cataract extraction      History of rectal polypectomy          FAMILY HISTORY  non-contributory     SOCIAL HISTORY  Social History:  Denies smoking and alcohol (2022 15:06)        ROS  General: Denies rigors, nightsweats  HEENT: Denies headache, rhinorrhea, sore throat, eye pain  CV: Denies CP, palpitations  PULM: Denies wheezing, hemoptysis  GI: Denies hematemesis, hematochezia, melena  : Denies discharge, hematuria  MSK: Denies arthralgias, myalgias  SKIN: Denies rash, lesions  NEURO: Denies paresthesias, weakness  PSYCH: Denies depression, anxiety     VITALS:  T(F): 98.5, Max: 98.6 (22 @ 14:02)  HR: 59  BP: 125/58  RR: 18Vital Signs Last 24 Hrs  T(C): 36.9 (2022 05:00), Max: 37 (2022 14:02)  T(F): 98.5 (2022 05:00), Max: 98.6 (2022 14:02)  HR: 59 (2022 10:10) (59 - 68)  BP: 125/58 (2022 05:00) (114/68 - 136/58)  BP(mean): 84 (2022 15:38) (84 - 84)  RR: 18 (2022 10:12) (18 - 19)  SpO2: 97% (2022 10:12) (96% - 99%)    PHYSICAL EXAM:  Gen: NAD, resting in bed  HEENT: Normocephalic, atraumatic  Neck: supple, no lymphadenopathy  CV: Regular rate & regular rhythm  Lungs: decreased BS at bases, no fremitus  Abdomen: Soft, BS present  Ext: Warm, well perfused  Neuro: non focal, awake  Skin: no rash, no erythema  Lines: no phlebitis     TESTS & MEASUREMENTS:                        10.4   5.10  )-----------( 142      ( 2022 05:46 )             31.9     07-01    139  |  104  |  39<H>  ----------------------------<  91  4.7   |  25  |  2.0<H>    Ca    9.0      2022 05:46  Mg     1.5     07-01    TPro  5.1<L>  /  Alb  3.3<L>  /  TBili  <0.2  /  DBili  x   /  AST  38  /  ALT  25  /  AlkPhos  97  07-01      LIVER FUNCTIONS - ( 2022 05:46 )  Alb: 3.3 g/dL / Pro: 5.1 g/dL / ALK PHOS: 97 U/L / ALT: 25 U/L / AST: 38 U/L / GGT: x           Urinalysis Basic - ( 2022 17:33 )    Color: Light Yellow / Appearance: Clear / S.007 / pH: x  Gluc: x / Ketone: Negative  / Bili: Negative / Urobili: <2 mg/dL   Blood: x / Protein: Negative / Nitrite: Negative   Leuk Esterase: Negative / RBC: x / WBC x   Sq Epi: x / Non Sq Epi: x / Bacteria: x              INFECTIOUS DISEASES TESTING      INFLAMMATORY MARKERS  Sedimentation Rate, Erythrocyte: 30 mm/Hr (22 @ 17:26)      RADIOLOGY & ADDITIONAL TESTS:  I have personally reviewed the last Chest xray  CXR      CT      CARDIOLOGY TESTING  12 Lead ECG:   Ventricular Rate 64 BPM    Atrial Rate 64 BPM    P-R Interval 166 ms    QRS Duration 76 ms    Q-T Interval 380 ms    QTC Calculation(Bazett) 392 ms    P Axis 48 degrees    R Axis 29 degrees    T Axis 40 degrees    Diagnosis Line Normal sinus rhythm  Normal ECG    Confirmed by Kai Weir ([id number]) on 2022 1:10:28 PM (22 @ 12:01)      MEDICATIONS  allopurinol 100 Oral daily  ascorbic acid  Oral Tab/Cap - Peds 1000 Oral daily  aspirin enteric coated 81 Oral daily  calcitriol   Capsule 0.5 Oral daily  chlorhexidine 4% Liquid 1 Topical <User Schedule>  colchicine 0.3 Oral daily  dextrose 5%. 1000 IV Continuous <Continuous>  heparin   Injectable 5000 SubCutaneous every 8 hours  insulin glargine Injectable (LANTUS) 35 SubCutaneous at bedtime  insulin lispro (ADMELOG) corrective regimen sliding scale  SubCutaneous three times a day before meals  lactobacillus acidophilus 1 Oral daily  levothyroxine 25 Oral daily  losartan 50 Oral daily  metoprolol succinate ER 50 Oral daily  multivitamin 1 Oral daily  simvastatin 10 Oral at bedtime        ANTIBIOTICS:      ALLERGIES:  No Known Allergies

## 2022-07-01 NOTE — DISCHARGE NOTE NURSING/CASE MANAGEMENT/SOCIAL WORK - PATIENT PORTAL LINK FT
You can access the FollowMyHealth Patient Portal offered by [facility] by registering at the following website: [email or web address]. By joining [employer]’s FollowMyHealth portal, you will also be able to view your health information using other applications (apps) compatible with our system.

## 2022-07-01 NOTE — DISCHARGE NOTE PROVIDER - NSDCMRMEDTOKEN_GEN_ALL_CORE_FT
allopurinol 100 mg oral tablet: 1 tab(s) orally once a day  aspirin 81 mg oral delayed release tablet: 1 tab(s) orally once a day  calcitriol 0.5 mcg oral capsule: 1 cap(s) orally once a day  colchicine 0.6 mg oral tablet: 1 tab(s) orally once a day  famotidine 40 mg oral tablet: 1 tab(s) orally once a day (at bedtime)  Fish Oil 1000 mg oral capsule: 1 cap(s) orally once a day  furosemide 20 mg oral tablet: 1 tab(s) orally once a day. Take an extra tab if weight gain of 3lb or more in 1 day  levothyroxine 25 mcg (0.025 mg) oral capsule: 1 cap(s) orally once a day  Lipotropic with Multivitamins oral tablet: 1 tab(s) orally once a day  losartan 50 mg oral tablet: 1 tab(s) orally once a day  metoprolol succinate 50 mg oral tablet, extended release: 1 tab(s) orally once a day  NovoLOG 100 units/mL injectable solution: injectable 3 times a day  Probiotic Formula oral capsule: 1 cap(s) orally once a day  simvastatin 10 mg oral tablet: 1 tab(s) orally once a day (at bedtime)  Tresiba 100 units/mL subcutaneous solution: subcutaneous once a day (at bedtime)  Vitamin C 500 mg oral capsule: 1 cap(s) orally once a day  Vitamin D2 1.25 mg (50,000 intl units) oral capsule: 1 cap(s) orally once a week

## 2022-07-01 NOTE — DISCHARGE NOTE PROVIDER - NSDCCPCAREPLAN_GEN_ALL_CORE_FT
PRINCIPAL DISCHARGE DIAGNOSIS  Diagnosis: Weakness  Assessment and Plan of Treatment: You presented with shortness of breath, weakness, sore throat, cough, lightheadedness for 2-3 weeks and positive covid test 2 days before at outpatient clinic. Your vital signs were stable at emergency department and your SpO2 of 98% at room air. Chest x-ray was normal. Blood tests were done. You were also tested for abnormal kidney function levels. You were also founf to have low magnesium levels and you were given magnesium. Infectious disease doctor was consulted for COVID infection. You were given Monoclonal Antibodies to prevent progression of covid infection and you were monitored for any adverse reactions. You are stable and have no complaints. You can be discharged today after getting monoclonal antibodies under observation.  You will be discharged with lasix and you are advised to check daily weights. You are advised to follow the covid isolation precaution for 1o days. Follow with your primary care physician after 2 weeks.        SECONDARY DISCHARGE DIAGNOSES  Diagnosis: Elevated troponin  Assessment and Plan of Treatment:     Diagnosis: TI (acute kidney injury)  Assessment and Plan of Treatment:     Diagnosis: Exposure to COVID-19 virus  Assessment and Plan of Treatment:

## 2022-07-01 NOTE — PROGRESS NOTE ADULT - ASSESSMENT
ASSESSMENT AND PLAN    89 yr old man with PMHx of multiple MI, TIA, HTN, MM in remission, on ASA, DM, Gout, BPPV, BPH, Hypomagnesia , hypothyroidism, presents with shortness of breath, weakness, lightheadedness for 2-3 weaks and positive covid test 2 days ago.     #Mild-Mod Covid   - low grade fever, sore throat, cough, x2-3 days, weakness x2-3 weaks, Covid positive at outpatient, reported 88% at home   - Unvaccinated   - Afebrile, 98%RA in ED, vitals stable  - Chest Xray: mild congestion, no pneumonia   - F/U inflammatory markers (procalc/ esr, crp/ ferritin/ ldh/ d-dimer) >> ESR 30 (6/30), CRP 13.3 (6/30), D-Dimer 450 (6/30)  - no need for steroid or RDV (also has TI)   - ID consult for mab to prevent progression >> pt would like to proceed with mab tx. Will consult ID.   - C/w isolation airborne and contact   - Tylenol for weakness/ fevers  - PT consult >> ambulatory pulse ox  - Will discuss advanced directives with patient's family upon arrival.     #CKD stage Stage III  - Baseline 1.7-2.0  in HIE, Cr 2.0 on admission GFR 31   - at baseline baseline   - Nephrology Dr. King  - F/U UA and urine lytes >> neg UA  - F/U bladder scan x1 since patient has hx of BPH     #Elevated trops  #Hx of MI/ CAD  - c/w aspirin, no longer on plavix   - Follows with Dr. Serna  - No active chest pain, trace edema on exam   - trop x1 0.03  - EKG NSR  - Echo not on file   - trend trops and EKG to rule out ACS   - Likely elevated in the setting of CKD. Less likely ACS  - Cardiologist: Dr. Serna     #DM  - f/u a1c  - On home tresiba 35-45 units and lispro prn TID  - Monitor FS. Start lantus 35 + sliding. Start TID dosing if consistently elevated FS.     #Gout  - c/w colchicine, renal dose given TI  - C/w allopurinol     #Hypothyroidism  - F/U TSH  - C/w synthroid     #Hx of hypomagnesia   - on home magnesium 500mg qd  - check magnesium. Can replete IV as needed >> Mg 1.5 (7/1) >> will replete with IV Mg.    #BPPV  - No symptoms at this time  - Patient no longer taking meclizine    #BPH  - not on flomax, not complaining of symptoms     #MM in remission  #Anemia of chronic disease  - F/U outpatient  - Hgb stable at baseline 10     #Misc  - GI prophylaxis: not needed   - DVT prophylaxis: heparin  - Diet: soft and bite sized, dash/ carb consistent   - Code status  - Dispo: acute, from home

## 2022-07-01 NOTE — DISCHARGE NOTE PROVIDER - HOSPITAL COURSE
89 yr old man with PMHx of multiple MI, TIA, HTN, MM in remission, on ASA, DM, Gout, BPPV, BPH, Hypomagnesia , hypothyroidism, presents with shortness of breath, weakness, lightheadedness for 2-3 weaks and positive covid test 2 days ago.     #Mild-Mod Covid   - low grade fever, sore throat, cough, x2-3 days, weakness x2-3 weaks, Covid positive at outpatient, reported 88% at home   - Unvaccinated   - Afebrile, 98%RA in ED, vitals stable  - Chest Xray: mild congestion, no pneumonia   - F/U inflammatory markers (procalc/ esr, crp/ ferritin/ ldh/ d-dimer) >> ESR 30 (6/30), CRP 13.3 (6/30), D-Dimer 450 (6/30)  - no need for steroid or RDV (also has TI)   - ID recs appreciated>> Monoclonal Abx given to prevent progression >> pt. agreed>>will monitor for any adverse reaction  - PT consult >> ambulatory pulse ox    #CKD stage Stage III  - Baseline 1.7-2.0  in HIE, Cr 2.0 on admission GFR 31   - at baseline baseline   - Nephrology Dr. King  - F/U UA and urine lytes >> neg UA  - F/U bladder scan x1 since patient has hx of BPH     #Elevated trops  #Hx of MI/ CAD  - c/w aspirin, no longer on plavix   - Follows with Dr. Serna  - No active chest pain, trace edema on exam   - trop x1 0.03  - EKG NSR  - Echo not on file   - trend trops and EKG to rule out ACS   - Likely elevated in the setting of CKD. Less likely ACS  - Cardiologist: Dr. Serna     #DM  - f/u a1c  - On home tresiba 35-45 units and lispro prn TID  - Monitor FS. Start lantus 35 + sliding. Start TID dosing if consistently elevated FS.     #Gout  - c/w colchicine, renal dose given TI  - C/w allopurinol     #Hypothyroidism  - F/U TSH  - C/w synthroid     #Hx of hypomagnesia   - on home magnesium 500mg qd  - check magnesium. Can replete IV as needed >> Mg 1.5 (7/1) >> will replete with IV Mg.    #BPPV  - No symptoms at this time  - Patient no longer taking meclizine    #BPH  - not on flomax, not complaining of symptoms     #MM in remission  #Anemia of chronic disease  - F/U outpatient  - Hgb stable at baseline 10     #Misc  - GI prophylaxis: not needed   - DVT prophylaxis: heparin  - Diet: soft and bite sized, dash/ carb consistent   - Code status  - Dispo: home       Pt. is stable, has no active complaints. Can be discharge after getting monoclonal antibodies under observation.

## 2022-07-14 NOTE — ED ADULT NURSE NOTE - CAS ELECT INFOMATION PROVIDED
[de-identified] : Emely Cabrera is a 27 yo female who presents for evaluation of bilateral aural fullness. She states that this started after going swimming on 7/4/22. She then felt bilateral aural fullness, right worse than left, and bilateral otalgia. She denies otorrhea, tinnitus, vertigo. She feels htat her hearing is slightly diminished on the right. She denies facial weakness or facial numbness. She notes history of swimmer's ear. She notes recurrent ear infections as a child and some as an adult. She denies fevers or chills. [FreeTextEntry1] : 7/14/22 - Emely presents for follow-up. She used ciprodex drops to both ears. She had slight improvement but notes that she feels pressure particularly in the right ear in the morning, but also slightly in the left ear. She intermittently develops cheek and jaw pressure, right worse than left. She feels that her hearing is diminished in the morning, but gets better during the day. She notes intermittent sharp pain bilaterally but denies otorrhea. She denies fevers or chills. She denies history of allergy testing. She denies significant nasal congestion but notes postnasal drainage. She denies recurrent sinus infections. DC instructions

## 2022-08-02 DIAGNOSIS — R42 DIZZINESS AND GIDDINESS: ICD-10-CM

## 2022-08-02 DIAGNOSIS — R50.9 FEVER, UNSPECIFIED: ICD-10-CM

## 2022-08-02 DIAGNOSIS — U07.1 COVID-19: ICD-10-CM

## 2022-08-02 DIAGNOSIS — E11.9 TYPE 2 DIABETES MELLITUS WITHOUT COMPLICATIONS: ICD-10-CM

## 2022-08-02 DIAGNOSIS — R53.1 WEAKNESS: ICD-10-CM

## 2022-08-02 DIAGNOSIS — Z79.4 LONG TERM (CURRENT) USE OF INSULIN: ICD-10-CM

## 2022-08-02 DIAGNOSIS — Z79.82 LONG TERM (CURRENT) USE OF ASPIRIN: ICD-10-CM

## 2022-08-02 DIAGNOSIS — N17.9 ACUTE KIDNEY FAILURE, UNSPECIFIED: ICD-10-CM

## 2022-08-02 DIAGNOSIS — I10 ESSENTIAL (PRIMARY) HYPERTENSION: ICD-10-CM

## 2022-08-02 DIAGNOSIS — Z86.73 PERSONAL HISTORY OF TRANSIENT ISCHEMIC ATTACK (TIA), AND CEREBRAL INFARCTION WITHOUT RESIDUAL DEFICITS: ICD-10-CM

## 2022-08-02 DIAGNOSIS — E03.9 HYPOTHYROIDISM, UNSPECIFIED: ICD-10-CM

## 2022-08-02 DIAGNOSIS — N40.0 BENIGN PROSTATIC HYPERPLASIA WITHOUT LOWER URINARY TRACT SYMPTOMS: ICD-10-CM

## 2022-08-02 DIAGNOSIS — Z79.02 LONG TERM (CURRENT) USE OF ANTITHROMBOTICS/ANTIPLATELETS: ICD-10-CM

## 2022-08-02 DIAGNOSIS — R74.8 ABNORMAL LEVELS OF OTHER SERUM ENZYMES: ICD-10-CM

## 2022-08-02 DIAGNOSIS — I25.2 OLD MYOCARDIAL INFARCTION: ICD-10-CM

## 2022-08-02 DIAGNOSIS — C90.01 MULTIPLE MYELOMA IN REMISSION: ICD-10-CM

## 2022-08-02 DIAGNOSIS — I25.10 ATHEROSCLEROTIC HEART DISEASE OF NATIVE CORONARY ARTERY WITHOUT ANGINA PECTORIS: ICD-10-CM

## 2022-12-16 ENCOUNTER — EMERGENCY (EMERGENCY)
Facility: HOSPITAL | Age: 87
LOS: 0 days | Discharge: HOME | End: 2022-12-16
Attending: EMERGENCY MEDICINE | Admitting: EMERGENCY MEDICINE

## 2022-12-16 VITALS
TEMPERATURE: 98 F | SYSTOLIC BLOOD PRESSURE: 116 MMHG | OXYGEN SATURATION: 98 % | DIASTOLIC BLOOD PRESSURE: 53 MMHG | HEART RATE: 82 BPM | RESPIRATION RATE: 18 BRPM

## 2022-12-16 DIAGNOSIS — I25.2 OLD MYOCARDIAL INFARCTION: ICD-10-CM

## 2022-12-16 DIAGNOSIS — M10.9 GOUT, UNSPECIFIED: ICD-10-CM

## 2022-12-16 DIAGNOSIS — N40.0 BENIGN PROSTATIC HYPERPLASIA WITHOUT LOWER URINARY TRACT SYMPTOMS: ICD-10-CM

## 2022-12-16 DIAGNOSIS — Z79.85 LONG-TERM (CURRENT) USE OF INJECTABLE NON-INSULIN ANTIDIABETIC DRUGS: ICD-10-CM

## 2022-12-16 DIAGNOSIS — Z98.890 OTHER SPECIFIED POSTPROCEDURAL STATES: Chronic | ICD-10-CM

## 2022-12-16 DIAGNOSIS — Z20.822 CONTACT WITH AND (SUSPECTED) EXPOSURE TO COVID-19: ICD-10-CM

## 2022-12-16 DIAGNOSIS — Z98.49 CATARACT EXTRACTION STATUS, UNSPECIFIED EYE: Chronic | ICD-10-CM

## 2022-12-16 DIAGNOSIS — E78.5 HYPERLIPIDEMIA, UNSPECIFIED: ICD-10-CM

## 2022-12-16 DIAGNOSIS — Z86.73 PERSONAL HISTORY OF TRANSIENT ISCHEMIC ATTACK (TIA), AND CEREBRAL INFARCTION WITHOUT RESIDUAL DEFICITS: ICD-10-CM

## 2022-12-16 DIAGNOSIS — C90.01 MULTIPLE MYELOMA IN REMISSION: ICD-10-CM

## 2022-12-16 DIAGNOSIS — R10.11 RIGHT UPPER QUADRANT PAIN: ICD-10-CM

## 2022-12-16 DIAGNOSIS — H81.10 BENIGN PAROXYSMAL VERTIGO, UNSPECIFIED EAR: ICD-10-CM

## 2022-12-16 DIAGNOSIS — K82.8 OTHER SPECIFIED DISEASES OF GALLBLADDER: ICD-10-CM

## 2022-12-16 DIAGNOSIS — K29.80 DUODENITIS WITHOUT BLEEDING: ICD-10-CM

## 2022-12-16 DIAGNOSIS — E11.9 TYPE 2 DIABETES MELLITUS WITHOUT COMPLICATIONS: ICD-10-CM

## 2022-12-16 DIAGNOSIS — Z79.82 LONG TERM (CURRENT) USE OF ASPIRIN: ICD-10-CM

## 2022-12-16 DIAGNOSIS — Z79.4 LONG TERM (CURRENT) USE OF INSULIN: ICD-10-CM

## 2022-12-16 DIAGNOSIS — I10 ESSENTIAL (PRIMARY) HYPERTENSION: ICD-10-CM

## 2022-12-16 LAB
ALBUMIN SERPL ELPH-MCNC: 3.4 G/DL — LOW (ref 3.5–5.2)
ALP SERPL-CCNC: 115 U/L — SIGNIFICANT CHANGE UP (ref 30–115)
ALT FLD-CCNC: 15 U/L — SIGNIFICANT CHANGE UP (ref 0–41)
ANION GAP SERPL CALC-SCNC: 9 MMOL/L — SIGNIFICANT CHANGE UP (ref 7–14)
APTT BLD: 33.9 SEC — SIGNIFICANT CHANGE UP (ref 27–39.2)
AST SERPL-CCNC: 25 U/L — SIGNIFICANT CHANGE UP (ref 0–41)
BASOPHILS # BLD AUTO: 0.06 K/UL — SIGNIFICANT CHANGE UP (ref 0–0.2)
BASOPHILS NFR BLD AUTO: 0.8 % — SIGNIFICANT CHANGE UP (ref 0–1)
BILIRUB DIRECT SERPL-MCNC: <0.2 MG/DL — SIGNIFICANT CHANGE UP (ref 0–0.3)
BILIRUB INDIRECT FLD-MCNC: >0.1 MG/DL — SIGNIFICANT CHANGE UP (ref 0.2–1.2)
BILIRUB SERPL-MCNC: <0.2 MG/DL — SIGNIFICANT CHANGE UP (ref 0.2–1.2)
BUN SERPL-MCNC: 36 MG/DL — HIGH (ref 10–20)
CALCIUM SERPL-MCNC: 9.9 MG/DL — SIGNIFICANT CHANGE UP (ref 8.4–10.5)
CHLORIDE SERPL-SCNC: 99 MMOL/L — SIGNIFICANT CHANGE UP (ref 98–110)
CO2 SERPL-SCNC: 28 MMOL/L — SIGNIFICANT CHANGE UP (ref 17–32)
CREAT SERPL-MCNC: 2 MG/DL — HIGH (ref 0.7–1.5)
EGFR: 31 ML/MIN/1.73M2 — LOW
EOSINOPHIL # BLD AUTO: 0.68 K/UL — SIGNIFICANT CHANGE UP (ref 0–0.7)
EOSINOPHIL NFR BLD AUTO: 9 % — HIGH (ref 0–8)
GLUCOSE SERPL-MCNC: 172 MG/DL — HIGH (ref 70–99)
HCT VFR BLD CALC: 26 % — LOW (ref 42–52)
HGB BLD-MCNC: 7.9 G/DL — LOW (ref 14–18)
IMM GRANULOCYTES NFR BLD AUTO: 0.3 % — SIGNIFICANT CHANGE UP (ref 0.1–0.3)
INR BLD: 1.01 RATIO — SIGNIFICANT CHANGE UP (ref 0.65–1.3)
LACTATE SERPL-SCNC: 1.9 MMOL/L — SIGNIFICANT CHANGE UP (ref 0.7–2)
LIDOCAIN IGE QN: 24 U/L — SIGNIFICANT CHANGE UP (ref 7–60)
LYMPHOCYTES # BLD AUTO: 2.6 K/UL — SIGNIFICANT CHANGE UP (ref 1.2–3.4)
LYMPHOCYTES # BLD AUTO: 34.5 % — SIGNIFICANT CHANGE UP (ref 20.5–51.1)
MCHC RBC-ENTMCNC: 25.9 PG — LOW (ref 27–31)
MCHC RBC-ENTMCNC: 30.4 G/DL — LOW (ref 32–37)
MCV RBC AUTO: 85.2 FL — SIGNIFICANT CHANGE UP (ref 80–94)
MONOCYTES # BLD AUTO: 0.78 K/UL — HIGH (ref 0.1–0.6)
MONOCYTES NFR BLD AUTO: 10.3 % — HIGH (ref 1.7–9.3)
NEUTROPHILS # BLD AUTO: 3.4 K/UL — SIGNIFICANT CHANGE UP (ref 1.4–6.5)
NEUTROPHILS NFR BLD AUTO: 45.1 % — SIGNIFICANT CHANGE UP (ref 42.2–75.2)
NRBC # BLD: 0 /100 WBCS — SIGNIFICANT CHANGE UP (ref 0–0)
PLATELET # BLD AUTO: 260 K/UL — SIGNIFICANT CHANGE UP (ref 130–400)
POTASSIUM SERPL-MCNC: 4.6 MMOL/L — SIGNIFICANT CHANGE UP (ref 3.5–5)
POTASSIUM SERPL-SCNC: 4.6 MMOL/L — SIGNIFICANT CHANGE UP (ref 3.5–5)
PROT SERPL-MCNC: 5.5 G/DL — LOW (ref 6–8)
PROTHROM AB SERPL-ACNC: 11.5 SEC — SIGNIFICANT CHANGE UP (ref 9.95–12.87)
RBC # BLD: 3.05 M/UL — LOW (ref 4.7–6.1)
RBC # FLD: 16.8 % — HIGH (ref 11.5–14.5)
SARS-COV-2 RNA SPEC QL NAA+PROBE: SIGNIFICANT CHANGE UP
SODIUM SERPL-SCNC: 136 MMOL/L — SIGNIFICANT CHANGE UP (ref 135–146)
WBC # BLD: 7.54 K/UL — SIGNIFICANT CHANGE UP (ref 4.8–10.8)
WBC # FLD AUTO: 7.54 K/UL — SIGNIFICANT CHANGE UP (ref 4.8–10.8)

## 2022-12-16 PROCEDURE — 99285 EMERGENCY DEPT VISIT HI MDM: CPT | Mod: GC

## 2022-12-16 PROCEDURE — 71045 X-RAY EXAM CHEST 1 VIEW: CPT | Mod: 26

## 2022-12-16 PROCEDURE — 74177 CT ABD & PELVIS W/CONTRAST: CPT | Mod: 26,MA

## 2022-12-16 PROCEDURE — 76705 ECHO EXAM OF ABDOMEN: CPT | Mod: 26

## 2022-12-16 RX ORDER — L.ACIDOPH/B.ANIMALIS/B.LONGUM 15B CELL
1 CAPSULE ORAL
Qty: 10 | Refills: 0
Start: 2022-12-16 | End: 2022-12-25

## 2022-12-16 RX ORDER — PANTOPRAZOLE SODIUM 20 MG/1
1 TABLET, DELAYED RELEASE ORAL
Qty: 10 | Refills: 0
Start: 2022-12-16 | End: 2022-12-25

## 2022-12-16 NOTE — ED ADULT NURSE NOTE - NSIMPLEMENTINTERV_GEN_ALL_ED
Implemented All Fall with Harm Risk Interventions:  [place] to call system. Call bell, personal items and telephone within reach. Instruct patient to call for assistance. Room bathroom lighting operational. Non-slip footwear when patient is off stretcher. Physically safe environment: no spills, clutter or unnecessary equipment. Stretcher in lowest position, wheels locked, appropriate side rails in place. Provide visual cue, wrist band, yellow gown, etc. Monitor gait and stability. Monitor for mental status changes and reorient to person, place, and time. Review medications for side effects contributing to fall risk. Reinforce activity limits and safety measures with patient and family. Provide visual clues: red socks.

## 2022-12-16 NOTE — ED PROVIDER NOTE - NSFOLLOWUPINSTRUCTIONS_ED_ALL_ED_FT
Duodenitis    Body outline showing the stomach and the small intestine.   Duodenitis is inflammation of the lining of the first part of the small intestine (duodenum). It is commonly caused by an infection from bacteria, which may also lead to open sores (ulcers) in the intestine.    Duodenitis may develop suddenly and last for a short time (acute), or it may develop gradually and last for months or years (chronic).      What are the causes?    The most common cause of duodenitis is an infection from a type of bacteria called Helicobacter pylori (H. pylori). Other causes of this condition include:  •Long-term use of NSAIDs.      •Excessive use of alcohol.      •An infection of the small intestine caused by the Giardia parasite (giardiasis).      •Crohn's disease.      •Certain diseases of the body's defense system (immune system).      •Certain treatments for cancer.        What increases the risk?    The following factors may make you more likely to develop this condition:  •Smoking cigarettes.      •Drinking alcohol.      •Having a family history of duodenitis.      •Taking NSAIDs.      •Eating a high-fat diet.        What are the signs or symptoms?    Symptoms of this condition may include:  •Gnawing or burning pain in the upper center of the abdomen (epigastric pain). This may get worse when the stomach is empty and may get better after eating.      •Abdominal cramps.      •Nausea and vomiting.      •Bloody vomit.      •Stools that are bloody, dark, or look like tar.      •Diarrhea.      •Weight loss.      •Fatigue.        How is this diagnosed?    This condition may be diagnosed based on your medical history and a physical exam. You may also have tests, such as:  •Blood tests.      •Stool tests.      •A test that checks the gases in your breath.      •An X-ray that is done after you swallow a liquid (barium) that makes your digestive tract easier to see.      •Endoscopy. This is an exam of the duodenum that is done by putting a thin tube with a tiny camera on the end (endoscope) down your throat. A sample of tissue from your duodenum (biopsy) may be removed with the endoscope and examined under a microscope for signs of inflammation and infection.        How is this treated?    Treatment depends on the cause of your condition. Treatment may include:  •Antibiotic medicine to treat H. pylori infection.      •Stopping your intake of NSAIDs.      •Medicine to reduce stomach acids.      •Medicines to treat other conditions, such as Crohn's disease or giardiasis.      •Surgery to treat severe inflammation that causes scarring or severe bleeding.        Follow these instructions at home:    Medicines     •Take over-the-counter and prescription medicines only as told by your health care provider.      •If you were prescribed an antibiotic medicine, take it as told by your health care provider. Do not stop taking the antibiotic even if you start to feel better.        Eating and drinking   A sign showing that a person should not drink alcohol.   •Eat small, frequent meals.      • Do not drink alcohol.      •Drink enough water to keep your urine pale yellow.    •Follow instructions from your health care provider about eating or drinking restrictions. You may be asked to avoid:  •Caffeinated drinks.      •Chocolate.      •Peppermint or mint-flavored food or drinks.      •Garlic or onions.      •Spicy foods.      •Citrus fruits.      •Tomato-based foods.      •Fatty or fried foods.        General instructions     • Do not use any products that contain nicotine or tobacco, such as cigarettes and e-cigarettes. If you need help quitting, ask your health care provider.      •Keep all follow-up visits as told by your health care provider. This is important.        Contact a health care provider if:    •You have a fever.      •Your symptoms come back, get worse, or do not get better with treatment.        Get help right away if:    •You vomit blood.      •You have severe abdominal pain.      •Your abdomen swells and is painful.      •You have a lot of blood in your stool.      •You feel dizzy or light-headed.        Summary    •Duodenitis is inflammation of the lining of the first part of the small intestine. This part of the small intestine is called the duodenum.      •Duodenitis may develop suddenly and last for a short time (acute), or it may develop gradually and last longer (chronic).      •The most common cause of duodenitis is an infection from a type of bacteria.      •Take over-the-counter and prescription medicines only as told by your health care provider.      This information is not intended to replace advice given to you by your health care provider. Make sure you discuss any questions you have with your health care provider.      Document Revised: 06/29/2022 Document Reviewed: 06/29/2022    [employer] Patient Education © 2022 [employer]

## 2022-12-16 NOTE — ED PROVIDER NOTE - CARE PROVIDER_API CALL
Mauricio Alvarez)  Surgery  [address] [place] [address]  Phone: [phone]  Fax: [phone]  Follow Up Time: 1-3 Days

## 2022-12-16 NOTE — ED PROVIDER NOTE - PHYSICAL EXAMINATION
_  Vital signs reviewed; ABCs intact  GENERAL: Well nourished, comfortable  SKIN: Warm, dry  HEAD & NECK: NCAT, supple neck  EYES: EOMI, PER B/L  ENT: MMM  CARD: RRR, S1, S2; no murmurs, no rubs, no gallops  RESP: Normal respiratory effort, CTAB, no rales, no wheezing  ABD: Soft, ND, +tender to RUQ and epigastric region; no rebound, no guarding; no CVAT  EXT: Pulses palpable distally  NEUROMSK: Grossly intact  PSYCH: Alert, awake, cooperative, appropriate

## 2022-12-16 NOTE — CONSULT NOTE ADULT - ASSESSMENT
89 M PMH of DM, vertigo, h/o MI, h/o TIA, CEA, Multiple myeloma in remission, gout, hypothyroidism, and HLD who presents with 5 days abdominal pain in RUQ. Patient saw PMD and had US o/p which suggested cholecystitis possible choledocholithiasis. Upon workup in ED pt found to be afebrile, no leukocytosis, pain resolving and imaging not concerning for acute cholecystitis, only showing cholelithiasis.     PLAN:  - Due to age, multiple medical comorbidities and resolution of pain it is believed to be in the best interest of the patient to schedule laparoscopic cholecystectomy as an outpatient after he has completed appropriate pre-operative risk stratification and optimization.   - Given reassuring imaging, labs and vitals it is most likely that patient passed stone if there was one   - Will discharge patient home with Augmentin x7 days, and recommend PPI daily for gastritis  - Low fat diet  - Follow up with Dr. Alvarez next week call office Monday  - Return to ED if temp >100.4, unable to tolerate diet, worsening abdominal pain, chest pain or shortness of breath.   -Patient informed of Creatinine of 2.0 and states he has follow up with nephrologist on Monday 12/19 and hgb of 7.9, daughter states patient has not been taking iron pills as prescribed and he was counseled on this as well    Above plan discussed with Attending Surgeon Dr. Alvarez, patient, patient family (daughter Iman), and ED. Patient and family agreeable to plan  12-16-22 @ 20:20

## 2022-12-16 NOTE — ED PROVIDER NOTE - ATTENDING CONTRIBUTION TO CARE
89 Dm, HTN, HLD, multiple myeloma, TIA, MI Presents to ED for acute cholecystitis.  Patient's been having right upper quadrant abdominal pain and had an outpatient ultrasound which demonstrated an elevated CBD at 10 mm and acute cholecystitis.  Patient states he feels slightly improved but he still has right upper quadrant pain when he takes a deep breath.  No fever no chills no nausea no vomiting no diarrhea no constipation.  Patient is not confused according to family.    Const: NAD  Eyes: PERRL, no conjunctival injection  HENT:  Neck supple without meningismus   CV: RRR, Warm, well-perfused extremities  RESP: CTA B/L, no tachypnea   GI: soft, RUQ abdominal tenderness, non-distended  MSK: No gross deformities appreciated  Skin: Warm, dry. No rashes  Neuro: Alert, CNs II-XII grossly intact. Sensation and motor function of extremities grossly intact.  Psych: Appropriate mood and affect.    will do labs, US, CT and call surgery 89 yo m Dm, HTN, HLD, multiple myeloma, TIA, MI Presents to ED for acute cholecystitis.  Patient's been having right upper quadrant abdominal pain and had an outpatient ultrasound which demonstrated an elevated CBD at 10 mm and acute cholecystitis.  Patient states he feels slightly improved but he still has right upper quadrant pain when he takes a deep breath.  No fever no chills no nausea no vomiting no diarrhea no constipation.  Patient is not confused according to family.    Const: NAD  Eyes: PERRL, no conjunctival injection  HENT:  Neck supple without meningismus   CV: RRR, Warm, well-perfused extremities  RESP: CTA B/L, no tachypnea   GI: soft, RUQ abdominal tenderness, non-distended  MSK: No gross deformities appreciated  Skin: Warm, dry. No rashes  Neuro: Alert, CNs II-XII grossly intact. Sensation and motor function of extremities grossly intact.  Psych: Appropriate mood and affect.    will do labs, US, CT and call surgery

## 2022-12-16 NOTE — ED PROVIDER NOTE - PROGRESS NOTE DETAILS
Dr. Tinajero: Talked with surgery. They will come evaluate pt Dr. Tinajero:All results back talk with surgery they do not believe there is any acute surgical pathology at this time.  Abdomen soft nontender nondistended.  No fever no elevated LFTs.  Surgery recommended starting patient on PPI as well as Augmentin due to duodenitis.  He can follow-up in surgery clinic.

## 2022-12-16 NOTE — CONSULT NOTE ADULT - PROVIDER SPECIALTY LIST ADULT
Surgery
Pre op diagnosis: Incisional hernia without obstruction or gangrene  Calculus of gallbladder without cholecystitis without obstruction.

## 2022-12-16 NOTE — ED PROVIDER NOTE - OBJECTIVE STATEMENT
Patient is a 89-year-old male with a history of MI, hypertension, TIA, multiple myeloma in remission, diabetes, gout, BPPV, BPH.  Patient brought in for right upper quadrant pain for 6 days.  Patient was seen by PMD who sent him for ultrasound, which was suspicious for cholecystitis. No fever, NVD. No other complaints - no rhinorrhea, sore throat, CP, palpitations, SOB, cough, abd pain, NVD, dysuria, hematuria, new joint pain, FND, rash, trauma.

## 2022-12-16 NOTE — ED ADULT NURSE NOTE - OBJECTIVE STATEMENT
Referred to ED by PCP for intermittent RUQ pain.  He denies having any nausea or vomiting.  No pain at this time.

## 2022-12-16 NOTE — ED PROVIDER NOTE - PATIENT PORTAL LINK FT
You can access the FollowMyHealth Patient Portal offered by [facility] by registering at the following website: [email or web address]. By joining "[employer]’s FollowMyHealth portal, you will also be able to view your health information using other applications (apps) compatible with our system.

## 2022-12-16 NOTE — ED PROVIDER NOTE - CLINICAL SUMMARY MEDICAL DECISION MAKING FREE TEXT BOX
89-year-old male presenting to ED for abdominal pain.  Patient had labs which were within normal limits.  CT scan demonstrated Inflammatory changes with the mesenteric fat adjacent to the gastric   pylorus/first portion of the duodenum. Finding can be seen with   gastritis/duodenitis.  Patient's abdomen improved he feels improved.  Surgery recommended Augmentin and Protonix and can follow-up with Dr. Alvarez in the surgery clinic.  Also gave patient probiotic.  Discussed results with patient and daughter and strict return precautions and understand DC home.

## 2022-12-16 NOTE — CONSULT NOTE ADULT - SUBJECTIVE AND OBJECTIVE BOX
GENERAL SURGERY CONSULT NOTE    Patient: KUN GLASER , 89y ([birth year])Male   MRN: [id number]  Location: [facility] ED  Visit: 12-16-22 Emergency  Date: 12-16-22 @ 20:20    HPI:  Mr. Glaser is a pleasant 89 yo M w/ a PMH of DM, vertigo, h/o MI, h/o TIA, CEA, Multiple myeloma in remission, gout, hypothyroidism, and HLD who presents after being sent in by his PMD. Patient has had abdominal pain for about 5 days but did not tell his family about the pain until 2 days ago. His daughter brought him to his PMD who ordered an O/P US Abdomen. The US read shows distended GB to 9cm, with cholelithiasis and CBD of 10mm (documents brought in by daughter). Reportedly also had abnormal lab values, unclear which labs as they did not bring that paperwork only the radiology report. He was sent in to ED for evaluation. He denies nausea, vomiting and fever. He endorses diarrhea but this is normal for him as per daughter.     PAST MEDICAL & SURGICAL HISTORY:  DM (diabetes mellitus)  H/O vertigo  Acute myocardial infarction  History of TIAs  left inner ear damage  Gout  Hypothyroidism  Multiple myeloma  [place] (hard of hearing)  HTN (hypertension)  H/O carotid endarterectomy  H/O cataract extraction  History of rectal polypectomy    Home Medications:  allopurinol 100 mg oral tablet: 1 tab(s) orally once a day (30 Jun 2022 16:27)  aspirin 81 mg oral delayed release tablet: 1 tab(s) orally once a day (27 Sep 2021 12:11)  calcitriol 0.5 mcg oral capsule: 1 cap(s) orally once a day (27 Sep 2021 12:11)  colchicine 0.6 mg oral tablet: 1 tab(s) orally once a day (30 Jun 2022 16:28)  famotidine 40 mg oral tablet: 1 tab(s) orally once a day (at bedtime) (27 Sep 2021 12:11)  Fish Oil 1000 mg oral capsule: 1 cap(s) orally once a day (27 Sep 2021 12:11)  furosemide 20 mg oral tablet: 1 tab(s) orally once a day. Take an extra tab if weight gain of 3lb or more in 1 day (01 Jul 2022 16:11)  levothyroxine 25 mcg (0.025 mg) oral capsule: 1 cap(s) orally once a day (27 Sep 2021 12:11)  Lipotropic with Multivitamins oral tablet: 1 tab(s) orally once a day (27 Sep 2021 12:11)  losartan 50 mg oral tablet: 1 tab(s) orally once a day (30 Jun 2022 16:28)  metoprolol succinate 50 mg oral tablet, extended release: 1 tab(s) orally once a day (27 Sep 2021 12:11)  NovoLOG 100 units/mL injectable solution: injectable 3 times a day (27 Sep 2021 12:11)  Probiotic Formula oral capsule: 1 cap(s) orally once a day (27 Sep 2021 12:11)  simvastatin 10 mg oral tablet: 1 tab(s) orally once a day (at bedtime) (27 Sep 2021 12:11)  Tresiba 100 units/mL subcutaneous solution: subcutaneous once a day (at bedtime) (27 Sep 2021 12:11)  Vitamin C 500 mg oral capsule: 1 cap(s) orally once a day (27 Sep 2021 12:11)  Vitamin D2 1.25 mg (50,000 intl units) oral capsule: 1 cap(s) orally once a week (27 Sep 2021 12:11)    VITALS:  T(F): 98.1 (12-16-22 @ 15:35), Max: 98.1 (12-16-22 @ 15:35)  HR: 82 (12-16-22 @ 15:35) (82 - 82)  BP: 116/53 (12-16-22 @ 15:35) (116/53 - 116/53)  RR: 18 (12-16-22 @ 15:35) (18 - 18)  SpO2: 98% (12-16-22 @ 15:35) (98% - 98%)    PHYSICAL EXAM:  General: NAD, AAOx3, calm and cooperative  General Appearance: NAD  HEENT: Normocephalic, atraumatic, trachea midline  Heart: s1, s2,    Lungs: No increased work of breathing or accessory muscle use. Symmetric chest wall rise and fall. Bilateral breath sounds  Abdomen:  Soft, minimal tenderness RUQ, mild distention throughout abdomen. Negative calderón sign  MSK/Extremities: Warm & well-perfused.   Skin: Warm, dry. No jaundice.       LAB/STUDIES:                      7.9    7.54  )-----------( 260      ( 16 Dec 2022 17:13 )             26.0     12-16  136  |  99  |  36<H>  ----------------------------<  172<H>  4.6   |  28  |  2.0<H>    Ca    9.9      16 Dec 2022 17:13    TPro  5.5<L>  /  Alb  3.4<L>  /  TBili  <0.2  /  DBili  <0.2  /  AST  25  /  ALT  15  /  AlkPhos  115  12-16    PT/INR - ( 16 Dec 2022 17:13 )   PT: 11.50 sec;   INR: 1.01 ratio    PTT - ( 16 Dec 2022 17:13 )  PTT:33.9 sec  LIVER FUNCTIONS - ( 16 Dec 2022 17:13 )  Alb: 3.4 g/dL / Pro: 5.5 g/dL / ALK PHOS: 115 U/L / ALT: 15 U/L / AST: 25 U/L / GGT: x           IMAGING:  < from: CT Abdomen and Pelvis w/ IV Cont (12.16.22 @ 18:16) >  IMPRESSION:  Inflammatory changeswith the mesenteric fat adjacent to the gastric   pylorus/first portion of the duodenum. Finding can be seen with   gastritis/duodenitis.    --- End of Report ---    AKIL MALONEY MD; Attending Radiologist  This document has been electronicallysigned. Dec 16 2022  7:42PM  < end of copied text >    < from: US Abdomen Upper Quadrant Right (12.16.22 @ 18:35) >  IMPRESSION:  Minimally distended gallbladder. Cholelithiasis better visualized on CT   from the same day.    --- End of Report ---  AKIL MALONEY MD; Attending Radiologist  This document has been electronically signed. Dec 16 2022  7:43PM  < end of copied text >

## 2022-12-28 ENCOUNTER — NON-APPOINTMENT (OUTPATIENT)
Age: 87
End: 2022-12-28

## 2023-01-06 ENCOUNTER — APPOINTMENT (OUTPATIENT)
Dept: CARDIOLOGY | Facility: CLINIC | Age: 88
End: 2023-01-06
Payer: MEDICARE

## 2023-01-06 VITALS
SYSTOLIC BLOOD PRESSURE: 110 MMHG | HEIGHT: 66 IN | DIASTOLIC BLOOD PRESSURE: 58 MMHG | WEIGHT: 180 LBS | HEART RATE: 70 BPM | BODY MASS INDEX: 28.93 KG/M2

## 2023-01-06 DIAGNOSIS — I65.29 OCCLUSION AND STENOSIS OF UNSPECIFIED CAROTID ARTERY: ICD-10-CM

## 2023-01-06 DIAGNOSIS — G45.9 TRANSIENT CEREBRAL ISCHEMIC ATTACK, UNSPECIFIED: ICD-10-CM

## 2023-01-06 DIAGNOSIS — Z78.9 OTHER SPECIFIED HEALTH STATUS: ICD-10-CM

## 2023-01-06 PROCEDURE — 93000 ELECTROCARDIOGRAM COMPLETE: CPT

## 2023-01-06 PROCEDURE — 99214 OFFICE O/P EST MOD 30 MIN: CPT

## 2023-01-06 NOTE — HISTORY OF PRESENT ILLNESS
[FreeTextEntry1] : Mr. Moe is a 89-year-old man with history of multiple myeloma, carotid artery disease s/p L CEA, TIA, DM2, HTN, CKD3, and HLD presenting for follow up.\par \par Patient previously followed with Dr. Serna and was last seen in office 10/2021.\par \par Patient is now planned for possible gallbladder surgery and requests pre-operative assessment and recommendations. He is also being seen by oncology for anemia and possibly undergoing transfusion next week. Overall exercise tolerance is poor, about half a flight of stairs limited by dyspnea and progressively worsening over the past year.\par \par IAM/PVR 10/2020\par - Normal study\par \par Event Monitor 10/2020\par - 14 days, rare PVCs, occasional SVT (14 episodes, longest 10 beats)\par \par AAA Duplex 11/2020\par - Negative study\par \par TTE 11/2021\par - Normal biventricular systolic function, G1DD, sigmoid septum with a gradient of 28mm with Valsalva maneuver, mild MR\par \par Carotid Duplex 11/2021\par - Bilateral mild ICA stenoses (<50%)\par \par Labs:\par - , , LDL 45, HDL 36\par - TSH 6.18, CRP 6.1 (<5)\par - Cr 2.0, K 4.6\par - Hgb 7.9, Plt 260

## 2023-01-06 NOTE — PHYSICAL EXAM
[Well Developed] : well developed [Well Nourished] : well nourished [No Acute Distress] : no acute distress [Normal Conjunctiva] : normal conjunctiva [Normal Venous Pressure] : normal venous pressure [Normal S1, S2] : normal S1, S2 [No Rub] : no rub [No Gallop] : no gallop [Clear Lung Fields] : clear lung fields [Good Air Entry] : good air entry [No Respiratory Distress] : no respiratory distress  [Soft] : abdomen soft [Non Tender] : non-tender [No Masses/organomegaly] : no masses/organomegaly [Normal Bowel Sounds] : normal bowel sounds [Abnormal Gait] : abnormal gait [No Cyanosis] : no cyanosis [No Clubbing] : no clubbing [No Varicosities] : no varicosities [No Rash] : no rash [No Skin Lesions] : no skin lesions [Moves all extremities] : moves all extremities [No Focal Deficits] : no focal deficits [Normal Speech] : normal speech [Alert and Oriented] : alert and oriented [Normal memory] : normal memory [de-identified] : L carotid bruit [de-identified] : 2/6 systolic murmur USBs [de-identified] : 1+ pitting edema bilaterally

## 2023-01-23 ENCOUNTER — APPOINTMENT (OUTPATIENT)
Dept: CARDIOLOGY | Facility: CLINIC | Age: 88
End: 2023-01-23
Payer: MEDICARE

## 2023-01-23 PROCEDURE — 93306 TTE W/DOPPLER COMPLETE: CPT

## 2023-01-23 PROCEDURE — 93880 EXTRACRANIAL BILAT STUDY: CPT

## 2023-02-03 ENCOUNTER — APPOINTMENT (OUTPATIENT)
Dept: CARDIOLOGY | Facility: CLINIC | Age: 88
End: 2023-02-03
Payer: MEDICARE

## 2023-02-03 DIAGNOSIS — R06.00 DYSPNEA, UNSPECIFIED: ICD-10-CM

## 2023-02-03 DIAGNOSIS — E78.5 HYPERLIPIDEMIA, UNSPECIFIED: ICD-10-CM

## 2023-02-03 DIAGNOSIS — I10 ESSENTIAL (PRIMARY) HYPERTENSION: ICD-10-CM

## 2023-02-03 DIAGNOSIS — E11.9 TYPE 2 DIABETES MELLITUS W/OUT COMPLICATIONS: ICD-10-CM

## 2023-02-03 PROCEDURE — 99441: CPT | Mod: 95

## 2023-05-03 ENCOUNTER — INPATIENT (INPATIENT)
Facility: HOSPITAL | Age: 88
LOS: 1 days | Discharge: NURSING, REHAB OR LONG-TERM CARE | DRG: 840 | End: 2023-05-05
Attending: INTERNAL MEDICINE | Admitting: INTERNAL MEDICINE
Payer: MEDICARE

## 2023-05-03 ENCOUNTER — TRANSCRIPTION ENCOUNTER (OUTPATIENT)
Age: 88
End: 2023-05-03

## 2023-05-03 VITALS
DIASTOLIC BLOOD PRESSURE: 48 MMHG | OXYGEN SATURATION: 99 % | WEIGHT: 169.98 LBS | TEMPERATURE: 98 F | HEART RATE: 79 BPM | RESPIRATION RATE: 18 BRPM | SYSTOLIC BLOOD PRESSURE: 87 MMHG

## 2023-05-03 DIAGNOSIS — Z98.890 OTHER SPECIFIED POSTPROCEDURAL STATES: Chronic | ICD-10-CM

## 2023-05-03 DIAGNOSIS — Z98.49 CATARACT EXTRACTION STATUS, UNSPECIFIED EYE: Chronic | ICD-10-CM

## 2023-05-03 LAB
ALBUMIN SERPL ELPH-MCNC: 3.1 G/DL — LOW (ref 3.5–5.2)
ALP SERPL-CCNC: 103 U/L — SIGNIFICANT CHANGE UP (ref 30–115)
ALT FLD-CCNC: 37 U/L — SIGNIFICANT CHANGE UP (ref 0–41)
ANION GAP SERPL CALC-SCNC: 13 MMOL/L — SIGNIFICANT CHANGE UP (ref 7–14)
ANISOCYTOSIS BLD QL: SIGNIFICANT CHANGE UP
APTT BLD: 29.6 SEC — SIGNIFICANT CHANGE UP (ref 27–39.2)
AST SERPL-CCNC: 41 U/L — SIGNIFICANT CHANGE UP (ref 0–41)
BASE EXCESS BLDV CALC-SCNC: -4.3 MMOL/L — LOW (ref -2–3)
BASOPHILS # BLD AUTO: 0 K/UL — SIGNIFICANT CHANGE UP (ref 0–0.2)
BASOPHILS NFR BLD AUTO: 0 % — SIGNIFICANT CHANGE UP (ref 0–1)
BILIRUB SERPL-MCNC: 0.8 MG/DL — SIGNIFICANT CHANGE UP (ref 0.2–1.2)
BLD GP AB SCN SERPL QL: SIGNIFICANT CHANGE UP
BUN SERPL-MCNC: 84 MG/DL — CRITICAL HIGH (ref 10–20)
CA-I SERPL-SCNC: 1.65 MMOL/L — CRITICAL HIGH (ref 1.15–1.33)
CALCIUM SERPL-MCNC: 11.3 MG/DL — HIGH (ref 8.4–10.4)
CHLORIDE SERPL-SCNC: 99 MMOL/L — SIGNIFICANT CHANGE UP (ref 98–110)
CK SERPL-CCNC: 53 U/L — SIGNIFICANT CHANGE UP (ref 0–225)
CO2 SERPL-SCNC: 20 MMOL/L — SIGNIFICANT CHANGE UP (ref 17–32)
CREAT SERPL-MCNC: 1.8 MG/DL — HIGH (ref 0.7–1.5)
DACRYOCYTES BLD QL SMEAR: SLIGHT — SIGNIFICANT CHANGE UP
EGFR: 35 ML/MIN/1.73M2 — LOW
EOSINOPHIL # BLD AUTO: 0.39 K/UL — SIGNIFICANT CHANGE UP (ref 0–0.7)
EOSINOPHIL NFR BLD AUTO: 3.5 % — SIGNIFICANT CHANGE UP (ref 0–8)
ETHANOL SERPL-MCNC: <10 MG/DL — SIGNIFICANT CHANGE UP
GAS PNL BLDV: 128 MMOL/L — LOW (ref 136–145)
GAS PNL BLDV: SIGNIFICANT CHANGE UP
GLUCOSE SERPL-MCNC: 211 MG/DL — HIGH (ref 70–99)
HCO3 BLDV-SCNC: 22 MMOL/L — SIGNIFICANT CHANGE UP (ref 22–29)
HCT VFR BLD CALC: 19.7 % — LOW (ref 42–52)
HCT VFR BLDA CALC: 37 % — LOW (ref 39–51)
HGB BLD CALC-MCNC: 12.4 G/DL — LOW (ref 12.6–17.4)
HGB BLD-MCNC: 6.1 G/DL — CRITICAL LOW (ref 14–18)
INR BLD: 1.08 RATIO — SIGNIFICANT CHANGE UP (ref 0.65–1.3)
LACTATE BLDV-MCNC: 2 MMOL/L — SIGNIFICANT CHANGE UP (ref 0.5–2)
LACTATE SERPL-SCNC: 2.1 MMOL/L — HIGH (ref 0.7–2)
LIDOCAIN IGE QN: 28 U/L — SIGNIFICANT CHANGE UP (ref 7–60)
LYMPHOCYTES # BLD AUTO: 1.78 K/UL — SIGNIFICANT CHANGE UP (ref 1.2–3.4)
LYMPHOCYTES # BLD AUTO: 15.8 % — LOW (ref 20.5–51.1)
MAGNESIUM SERPL-MCNC: 1.6 MG/DL — LOW (ref 1.8–2.4)
MANUAL SMEAR VERIFICATION: SIGNIFICANT CHANGE UP
MCHC RBC-ENTMCNC: 28.4 PG — SIGNIFICANT CHANGE UP (ref 27–31)
MCHC RBC-ENTMCNC: 31 G/DL — LOW (ref 32–37)
MCV RBC AUTO: 91.6 FL — SIGNIFICANT CHANGE UP (ref 80–94)
MICROCYTES BLD QL: SLIGHT — SIGNIFICANT CHANGE UP
MONOCYTES # BLD AUTO: 0.29 K/UL — SIGNIFICANT CHANGE UP (ref 0.1–0.6)
MONOCYTES NFR BLD AUTO: 2.6 % — SIGNIFICANT CHANGE UP (ref 1.7–9.3)
NEUTROPHILS # BLD AUTO: 8.8 K/UL — HIGH (ref 1.4–6.5)
NEUTROPHILS NFR BLD AUTO: 78.1 % — HIGH (ref 42.2–75.2)
NT-PROBNP SERPL-SCNC: 881 PG/ML — HIGH (ref 0–300)
PCO2 BLDV: 45 MMHG — SIGNIFICANT CHANGE UP (ref 42–55)
PH BLDV: 7.3 — LOW (ref 7.32–7.43)
PHOSPHATE SERPL-MCNC: 4.1 MG/DL — SIGNIFICANT CHANGE UP (ref 2.1–4.9)
PLAT MORPH BLD: NORMAL — SIGNIFICANT CHANGE UP
PLATELET # BLD AUTO: 356 K/UL — SIGNIFICANT CHANGE UP (ref 130–400)
PMV BLD: 10.3 FL — SIGNIFICANT CHANGE UP (ref 7.4–10.4)
PO2 BLDV: 21 MMHG — SIGNIFICANT CHANGE UP
POIKILOCYTOSIS BLD QL AUTO: SLIGHT — SIGNIFICANT CHANGE UP
POLYCHROMASIA BLD QL SMEAR: SIGNIFICANT CHANGE UP
POTASSIUM BLDV-SCNC: 4.3 MMOL/L — SIGNIFICANT CHANGE UP (ref 3.5–5.1)
POTASSIUM SERPL-MCNC: 4.5 MMOL/L — SIGNIFICANT CHANGE UP (ref 3.5–5)
POTASSIUM SERPL-SCNC: 4.5 MMOL/L — SIGNIFICANT CHANGE UP (ref 3.5–5)
PROT SERPL-MCNC: 5.2 G/DL — LOW (ref 6–8)
PROTHROM AB SERPL-ACNC: 12.4 SEC — SIGNIFICANT CHANGE UP (ref 9.95–12.87)
RBC # BLD: 2.15 M/UL — LOW (ref 4.7–6.1)
RBC # FLD: 17.6 % — HIGH (ref 11.5–14.5)
RBC BLD AUTO: ABNORMAL
SAO2 % BLDV: 22.5 % — SIGNIFICANT CHANGE UP
SODIUM SERPL-SCNC: 132 MMOL/L — LOW (ref 135–146)
TROPONIN T SERPL-MCNC: 0.06 NG/ML — CRITICAL HIGH
WBC # BLD: 11.27 K/UL — HIGH (ref 4.8–10.8)
WBC # FLD AUTO: 11.27 K/UL — HIGH (ref 4.8–10.8)

## 2023-05-03 PROCEDURE — 70450 CT HEAD/BRAIN W/O DYE: CPT | Mod: 26,MA

## 2023-05-03 PROCEDURE — 99285 EMERGENCY DEPT VISIT HI MDM: CPT

## 2023-05-03 PROCEDURE — 74176 CT ABD & PELVIS W/O CONTRAST: CPT | Mod: 26,MA

## 2023-05-03 PROCEDURE — 71045 X-RAY EXAM CHEST 1 VIEW: CPT | Mod: 26

## 2023-05-03 PROCEDURE — 72125 CT NECK SPINE W/O DYE: CPT | Mod: 26,MA

## 2023-05-03 PROCEDURE — 71250 CT THORAX DX C-: CPT | Mod: 26,MA

## 2023-05-03 PROCEDURE — 72170 X-RAY EXAM OF PELVIS: CPT | Mod: 26

## 2023-05-03 RX ORDER — GABAPENTIN 400 MG/1
300 CAPSULE ORAL ONCE
Refills: 0 | Status: COMPLETED | OUTPATIENT
Start: 2023-05-03 | End: 2023-05-03

## 2023-05-03 RX ORDER — MAGNESIUM SULFATE 500 MG/ML
2 VIAL (ML) INJECTION ONCE
Refills: 0 | Status: COMPLETED | OUTPATIENT
Start: 2023-05-03 | End: 2023-05-03

## 2023-05-03 RX ORDER — SODIUM CHLORIDE 9 MG/ML
1000 INJECTION INTRAMUSCULAR; INTRAVENOUS; SUBCUTANEOUS ONCE
Refills: 0 | Status: COMPLETED | OUTPATIENT
Start: 2023-05-03 | End: 2023-05-03

## 2023-05-03 RX ORDER — SODIUM CHLORIDE 9 MG/ML
1000 INJECTION, SOLUTION INTRAVENOUS ONCE
Refills: 0 | Status: DISCONTINUED | OUTPATIENT
Start: 2023-05-03 | End: 2023-05-03

## 2023-05-03 RX ORDER — METHOCARBAMOL 500 MG/1
750 TABLET, FILM COATED ORAL ONCE
Refills: 0 | Status: COMPLETED | OUTPATIENT
Start: 2023-05-03 | End: 2023-05-03

## 2023-05-03 RX ADMIN — SODIUM CHLORIDE 1000 MILLILITER(S): 9 INJECTION INTRAMUSCULAR; INTRAVENOUS; SUBCUTANEOUS at 23:05

## 2023-05-03 NOTE — ED PROVIDER NOTE - DIFFERENTIAL DIAGNOSIS
Differential Diagnosis deconditioning, scapular fracture, acute on chronic anemia 2/2 MM, elevated bun

## 2023-05-03 NOTE — ED PROVIDER NOTE - OBJECTIVE STATEMENT
Patient is a 89-year-old male with PMH of HTN, MM, Anemia, DM, and CAD presenting for falls. Per daughter, patient has been experiencing multiple falls over the past few weeks, most recently two witnessed falls yesterday. Daughter states she heard the patient fall; unclear if he hit his head but denies any LOC and was able to assist him off the ground immediately after the incident. Daughter endorses patient has been increasingly weak but otherwise denies any fevers, chest pain, dyspnea, nausea/vomiting/diarrhea, blood in stool, urinary symptoms or any other complaints.

## 2023-05-03 NOTE — ED PROVIDER NOTE - CARE PLAN
Principal Discharge DX:	Frequent falls  Secondary Diagnosis:	Elevated BUN  Secondary Diagnosis:	Anemia  Secondary Diagnosis:	Multiple myeloma  Secondary Diagnosis:	Scapula fracture   1

## 2023-05-03 NOTE — ED PROVIDER NOTE - PHYSICAL EXAMINATION

## 2023-05-03 NOTE — ED PROVIDER NOTE - CLINICAL SUMMARY MEDICAL DECISION MAKING FREE TEXT BOX
Labs and EKG were ordered and reviewed, where indicated.  Imaging was ordered and reviewed by me, where indicated.  Appropriate medications for patient's presenting complaints were ordered and effects were reassessed, where indicated.  Patient's records (prior hospital, ED visit, and/or nursing home note) were reviewed, if available.  Additional history was obtained from EMS, family, and/or PCP (where available).  Escalation to admission/observation was considered.  Patient requires inpatient hospitalization - monitored setting.     Authored by Dr. Angela Josue: s/o to me by Dr. Brar - 89-year-old male PMH significant for anemia 2/2 active multiple myeloma p/w 2frequent falls and generalized weakness, ED work-up significant for acute on chronic anemia hemoglobin of 6, transfused 1 unit PRBC, TI, hypomag repleted, imaging revealing scapular fracture, seen placed Ortho consulted, patient and family uncomfortable with discharge to home, feel patient is a fall risk, will admit for inpatient rehab and further management

## 2023-05-03 NOTE — ED PROVIDER NOTE - NSICDXPASTMEDICALHX_GEN_ALL_CORE_FT
PAST MEDICAL HISTORY:  Acute myocardial infarction     DM (diabetes mellitus)     Gout     H/O vertigo     History of TIAs left inner ear damage    [place] (hard of hearing)     HTN (hypertension)     Hypothyroidism as per pt's daughter my dad had his thyroid levels drawn  within 2-3 months- the medication dose remained the same    Multiple myeloma

## 2023-05-03 NOTE — ED ADULT TRIAGE NOTE - CHIEF COMPLAINT QUOTE
PT presents to the ED w/ c/o multiple falls this week. Pt fell yesterday morning. Pt was attempting to get back to bed from commode, stood up and fell down. Daughter endorses pt takes aspirin on a daily basis. PT A&Ox2 in triage.   PMHx anemia, multiple myeloma, DM PT presents to the ED w/ c/o multiple falls this week. Pt last fall was an unwitnessed fall yesterday morning. Pt was attempting to get back to bed from commode, stood up and fell down. Daughter endorses pt takes aspirin on a daily basis. Pt A&Ox2 in triage. Pt hypotensive in triage, brought to crit evaluated by MD Brar. No trauma alert activated as per MD  PMHx anemia, multiple myeloma, DM

## 2023-05-03 NOTE — ED PROVIDER NOTE - ATTENDING CONTRIBUTION TO CARE
89-year-old male past medical history as documented, presenting with multiple falls over the past few weeks, most recently yesterday.  Patient has been extremely weak over the past few weeks per family at bedside.  Per family, patient was supposed to be getting treatment for his multiple myeloma, but due to weakness has not been following up.  Otherwise no known fevers, no chest pain, dyspnea, nausea/vomiting/diarrhea, blood in stool, urinary symptoms or any other complaints.  During falls, unclear if patient hit his head or had LOC and his falls were unwitnessed and patient is limited historian.    Vital Signs: I have reviewed the initial vital signs.  Constitutional: NAD, well-nourished, appears stated age, no acute distress.  HEENT: Airway patent, moist MM, no erythema/swelling/deformity of oral structures. EOMI, PERRLA.  CV: regular rate, regular rhythm, well-perfused extremities, 2+ b/l DP and radial pulses equal.  Lungs: BCTA, no increased WOB.  ABD: NTND, no guarding or rebound, no pulsatile mass, no hernias.   MSK: Neck supple, nontender, nl ROM, no stepoff. Chest nontender. Back nontender in TLS spine or to b/l bony structures or flanks. Ext nontender, nl rom, no deformity.   INTEG: Skin warm, dry, no rash.  NEURO: A&Ox2 (baseline per family), normal strength, nl sensation throughout, normal speech.   PSYCH: Calm, cooperative, normal affect and interaction.    Neurovascularly intact at this time.  Will obtain pan scan given multiple falls, labs, reeval.

## 2023-05-04 DIAGNOSIS — R29.6 REPEATED FALLS: ICD-10-CM

## 2023-05-04 LAB
ALBUMIN SERPL ELPH-MCNC: 2.7 G/DL — LOW (ref 3.5–5.2)
ALP SERPL-CCNC: 95 U/L — SIGNIFICANT CHANGE UP (ref 30–115)
ALT FLD-CCNC: 33 U/L — SIGNIFICANT CHANGE UP (ref 0–41)
ANION GAP SERPL CALC-SCNC: 10 MMOL/L — SIGNIFICANT CHANGE UP (ref 7–14)
APPEARANCE UR: CLEAR — SIGNIFICANT CHANGE UP
AST SERPL-CCNC: 40 U/L — SIGNIFICANT CHANGE UP (ref 0–41)
BACTERIA # UR AUTO: NEGATIVE — SIGNIFICANT CHANGE UP
BASOPHILS # BLD AUTO: 0.06 K/UL — SIGNIFICANT CHANGE UP (ref 0–0.2)
BASOPHILS NFR BLD AUTO: 0.7 % — SIGNIFICANT CHANGE UP (ref 0–1)
BILIRUB SERPL-MCNC: 1.3 MG/DL — HIGH (ref 0.2–1.2)
BILIRUB UR-MCNC: NEGATIVE — SIGNIFICANT CHANGE UP
BUN SERPL-MCNC: 88 MG/DL — CRITICAL HIGH (ref 10–20)
CALCIUM SERPL-MCNC: 10.8 MG/DL — HIGH (ref 8.4–10.4)
CHLORIDE SERPL-SCNC: 104 MMOL/L — SIGNIFICANT CHANGE UP (ref 98–110)
CO2 SERPL-SCNC: 20 MMOL/L — SIGNIFICANT CHANGE UP (ref 17–32)
COLOR SPEC: YELLOW — SIGNIFICANT CHANGE UP
CREAT SERPL-MCNC: 1.6 MG/DL — HIGH (ref 0.7–1.5)
DIFF PNL FLD: NEGATIVE — SIGNIFICANT CHANGE UP
EGFR: 40 ML/MIN/1.73M2 — LOW
EOSINOPHIL # BLD AUTO: 0.48 K/UL — SIGNIFICANT CHANGE UP (ref 0–0.7)
EOSINOPHIL NFR BLD AUTO: 5.4 % — SIGNIFICANT CHANGE UP (ref 0–8)
EPI CELLS # UR: 1 /HPF — SIGNIFICANT CHANGE UP (ref 0–5)
GLUCOSE BLDC GLUCOMTR-MCNC: 198 MG/DL — HIGH (ref 70–99)
GLUCOSE BLDC GLUCOMTR-MCNC: 229 MG/DL — HIGH (ref 70–99)
GLUCOSE BLDC GLUCOMTR-MCNC: 230 MG/DL — HIGH (ref 70–99)
GLUCOSE BLDC GLUCOMTR-MCNC: 287 MG/DL — HIGH (ref 70–99)
GLUCOSE SERPL-MCNC: 198 MG/DL — HIGH (ref 70–99)
GLUCOSE UR QL: NEGATIVE — SIGNIFICANT CHANGE UP
GRAN CASTS # UR COMP ASSIST: 1 /LPF — HIGH
HCT VFR BLD CALC: 21.4 % — LOW (ref 42–52)
HCT VFR BLD CALC: 23.9 % — LOW (ref 42–52)
HGB BLD-MCNC: 6.8 G/DL — CRITICAL LOW (ref 14–18)
HGB BLD-MCNC: 7.8 G/DL — LOW (ref 14–18)
HYALINE CASTS # UR AUTO: 12 /LPF — HIGH (ref 0–7)
IMM GRANULOCYTES NFR BLD AUTO: 0.6 % — HIGH (ref 0.1–0.3)
KETONES UR-MCNC: NEGATIVE — SIGNIFICANT CHANGE UP
LACTATE SERPL-SCNC: 1.5 MMOL/L — SIGNIFICANT CHANGE UP (ref 0.7–2)
LEUKOCYTE ESTERASE UR-ACNC: NEGATIVE — SIGNIFICANT CHANGE UP
LYMPHOCYTES # BLD AUTO: 2.11 K/UL — SIGNIFICANT CHANGE UP (ref 1.2–3.4)
LYMPHOCYTES # BLD AUTO: 23.8 % — SIGNIFICANT CHANGE UP (ref 20.5–51.1)
MAGNESIUM SERPL-MCNC: 1.7 MG/DL — LOW (ref 1.8–2.4)
MCHC RBC-ENTMCNC: 28.3 PG — SIGNIFICANT CHANGE UP (ref 27–31)
MCHC RBC-ENTMCNC: 28.4 PG — SIGNIFICANT CHANGE UP (ref 27–31)
MCHC RBC-ENTMCNC: 31.8 G/DL — LOW (ref 32–37)
MCHC RBC-ENTMCNC: 32.6 G/DL — SIGNIFICANT CHANGE UP (ref 32–37)
MCV RBC AUTO: 86.9 FL — SIGNIFICANT CHANGE UP (ref 80–94)
MCV RBC AUTO: 89.2 FL — SIGNIFICANT CHANGE UP (ref 80–94)
MONOCYTES # BLD AUTO: 0.58 K/UL — SIGNIFICANT CHANGE UP (ref 0.1–0.6)
MONOCYTES NFR BLD AUTO: 6.5 % — SIGNIFICANT CHANGE UP (ref 1.7–9.3)
NEUTROPHILS # BLD AUTO: 5.59 K/UL — SIGNIFICANT CHANGE UP (ref 1.4–6.5)
NEUTROPHILS NFR BLD AUTO: 63 % — SIGNIFICANT CHANGE UP (ref 42.2–75.2)
NITRITE UR-MCNC: NEGATIVE — SIGNIFICANT CHANGE UP
NRBC # BLD: 0 /100 WBCS — SIGNIFICANT CHANGE UP (ref 0–0)
NRBC # BLD: 0 /100 WBCS — SIGNIFICANT CHANGE UP (ref 0–0)
PH UR: 5.5 — SIGNIFICANT CHANGE UP (ref 5–8)
PLATELET # BLD AUTO: 254 K/UL — SIGNIFICANT CHANGE UP (ref 130–400)
PLATELET # BLD AUTO: 283 K/UL — SIGNIFICANT CHANGE UP (ref 130–400)
PMV BLD: 10.2 FL — SIGNIFICANT CHANGE UP (ref 7.4–10.4)
PMV BLD: 9.7 FL — SIGNIFICANT CHANGE UP (ref 7.4–10.4)
POTASSIUM SERPL-MCNC: 4.4 MMOL/L — SIGNIFICANT CHANGE UP (ref 3.5–5)
POTASSIUM SERPL-SCNC: 4.4 MMOL/L — SIGNIFICANT CHANGE UP (ref 3.5–5)
PROT SERPL-MCNC: 4.7 G/DL — LOW (ref 6–8)
PROT UR-MCNC: ABNORMAL
RBC # BLD: 2.4 M/UL — LOW (ref 4.7–6.1)
RBC # BLD: 2.75 M/UL — LOW (ref 4.7–6.1)
RBC # FLD: 16.3 % — HIGH (ref 11.5–14.5)
RBC # FLD: 17.2 % — HIGH (ref 11.5–14.5)
RBC CASTS # UR COMP ASSIST: 1 /HPF — SIGNIFICANT CHANGE UP (ref 0–4)
SARS-COV-2 RNA SPEC QL NAA+PROBE: SIGNIFICANT CHANGE UP
SODIUM SERPL-SCNC: 134 MMOL/L — LOW (ref 135–146)
SP GR SPEC: 1.02 — SIGNIFICANT CHANGE UP (ref 1.01–1.03)
TROPONIN T SERPL-MCNC: 0.06 NG/ML — CRITICAL HIGH
URATE CRY FLD QL MICRO: ABNORMAL
UROBILINOGEN FLD QL: SIGNIFICANT CHANGE UP
WBC # BLD: 8.87 K/UL — SIGNIFICANT CHANGE UP (ref 4.8–10.8)
WBC # BLD: 9.66 K/UL — SIGNIFICANT CHANGE UP (ref 4.8–10.8)
WBC # FLD AUTO: 8.87 K/UL — SIGNIFICANT CHANGE UP (ref 4.8–10.8)
WBC # FLD AUTO: 9.66 K/UL — SIGNIFICANT CHANGE UP (ref 4.8–10.8)
WBC UR QL: 1 /HPF — SIGNIFICANT CHANGE UP (ref 0–5)

## 2023-05-04 PROCEDURE — 97162 PT EVAL MOD COMPLEX 30 MIN: CPT | Mod: GP

## 2023-05-04 PROCEDURE — C9113: CPT

## 2023-05-04 PROCEDURE — 92610 EVALUATE SWALLOWING FUNCTION: CPT | Mod: GN

## 2023-05-04 PROCEDURE — 84484 ASSAY OF TROPONIN QUANT: CPT

## 2023-05-04 PROCEDURE — U0005: CPT

## 2023-05-04 PROCEDURE — 83540 ASSAY OF IRON: CPT

## 2023-05-04 PROCEDURE — 82962 GLUCOSE BLOOD TEST: CPT

## 2023-05-04 PROCEDURE — 36415 COLL VENOUS BLD VENIPUNCTURE: CPT

## 2023-05-04 PROCEDURE — 99223 1ST HOSP IP/OBS HIGH 75: CPT

## 2023-05-04 PROCEDURE — 73010 X-RAY EXAM OF SHOULDER BLADE: CPT | Mod: 26,LT

## 2023-05-04 PROCEDURE — 83735 ASSAY OF MAGNESIUM: CPT

## 2023-05-04 PROCEDURE — 82728 ASSAY OF FERRITIN: CPT

## 2023-05-04 PROCEDURE — 84155 ASSAY OF PROTEIN SERUM: CPT | Mod: 59

## 2023-05-04 PROCEDURE — 80053 COMPREHEN METABOLIC PANEL: CPT

## 2023-05-04 PROCEDURE — P9040: CPT

## 2023-05-04 PROCEDURE — 99222 1ST HOSP IP/OBS MODERATE 55: CPT

## 2023-05-04 PROCEDURE — 85027 COMPLETE CBC AUTOMATED: CPT

## 2023-05-04 PROCEDURE — 83010 ASSAY OF HAPTOGLOBIN QUANT: CPT

## 2023-05-04 PROCEDURE — 36430 TRANSFUSION BLD/BLD COMPNT: CPT

## 2023-05-04 PROCEDURE — 83550 IRON BINDING TEST: CPT

## 2023-05-04 PROCEDURE — 86923 COMPATIBILITY TEST ELECTRIC: CPT

## 2023-05-04 PROCEDURE — 86900 BLOOD TYPING SEROLOGIC ABO: CPT

## 2023-05-04 PROCEDURE — 73010 X-RAY EXAM OF SHOULDER BLADE: CPT | Mod: LT

## 2023-05-04 PROCEDURE — 86334 IMMUNOFIX E-PHORESIS SERUM: CPT | Mod: 59

## 2023-05-04 PROCEDURE — U0003: CPT

## 2023-05-04 PROCEDURE — 84165 PROTEIN E-PHORESIS SERUM: CPT

## 2023-05-04 PROCEDURE — 85025 COMPLETE CBC W/AUTO DIFF WBC: CPT

## 2023-05-04 PROCEDURE — 86901 BLOOD TYPING SEROLOGIC RH(D): CPT

## 2023-05-04 PROCEDURE — 86850 RBC ANTIBODY SCREEN: CPT

## 2023-05-04 PROCEDURE — 83036 HEMOGLOBIN GLYCOSYLATED A1C: CPT

## 2023-05-04 RX ORDER — LANOLIN ALCOHOL/MO/W.PET/CERES
3 CREAM (GRAM) TOPICAL AT BEDTIME
Refills: 0 | Status: DISCONTINUED | OUTPATIENT
Start: 2023-05-04 | End: 2023-05-05

## 2023-05-04 RX ORDER — GLUCAGON INJECTION, SOLUTION 0.5 MG/.1ML
1 INJECTION, SOLUTION SUBCUTANEOUS ONCE
Refills: 0 | Status: DISCONTINUED | OUTPATIENT
Start: 2023-05-04 | End: 2023-05-05

## 2023-05-04 RX ORDER — SODIUM CHLORIDE 9 MG/ML
1000 INJECTION, SOLUTION INTRAVENOUS
Refills: 0 | Status: DISCONTINUED | OUTPATIENT
Start: 2023-05-04 | End: 2023-05-05

## 2023-05-04 RX ORDER — LACTOBACILLUS ACIDOPHILUS 100MM CELL
1 CAPSULE ORAL EVERY 12 HOURS
Refills: 0 | Status: DISCONTINUED | OUTPATIENT
Start: 2023-05-04 | End: 2023-05-05

## 2023-05-04 RX ORDER — ALLOPURINOL 300 MG
100 TABLET ORAL DAILY
Refills: 0 | Status: DISCONTINUED | OUTPATIENT
Start: 2023-05-04 | End: 2023-05-05

## 2023-05-04 RX ORDER — SIMVASTATIN 20 MG/1
10 TABLET, FILM COATED ORAL AT BEDTIME
Refills: 0 | Status: DISCONTINUED | OUTPATIENT
Start: 2023-05-04 | End: 2023-05-05

## 2023-05-04 RX ORDER — METOPROLOL TARTRATE 50 MG
50 TABLET ORAL DAILY
Refills: 0 | Status: DISCONTINUED | OUTPATIENT
Start: 2023-05-04 | End: 2023-05-05

## 2023-05-04 RX ORDER — OMEGA-3 ACID ETHYL ESTERS 1 G
2 CAPSULE ORAL
Refills: 0 | Status: DISCONTINUED | OUTPATIENT
Start: 2023-05-04 | End: 2023-05-05

## 2023-05-04 RX ORDER — ERGOCALCIFEROL 1.25 MG/1
50000 CAPSULE ORAL
Refills: 0 | Status: DISCONTINUED | OUTPATIENT
Start: 2023-05-04 | End: 2023-05-05

## 2023-05-04 RX ORDER — INSULIN LISPRO 100/ML
VIAL (ML) SUBCUTANEOUS
Refills: 0 | Status: DISCONTINUED | OUTPATIENT
Start: 2023-05-04 | End: 2023-05-05

## 2023-05-04 RX ORDER — INSULIN GLARGINE 100 [IU]/ML
35 INJECTION, SOLUTION SUBCUTANEOUS AT BEDTIME
Refills: 0 | Status: DISCONTINUED | OUTPATIENT
Start: 2023-05-04 | End: 2023-05-05

## 2023-05-04 RX ORDER — DEXTROSE 50 % IN WATER 50 %
12.5 SYRINGE (ML) INTRAVENOUS ONCE
Refills: 0 | Status: DISCONTINUED | OUTPATIENT
Start: 2023-05-04 | End: 2023-05-05

## 2023-05-04 RX ORDER — HEPARIN SODIUM 5000 [USP'U]/ML
5000 INJECTION INTRAVENOUS; SUBCUTANEOUS EVERY 12 HOURS
Refills: 0 | Status: DISCONTINUED | OUTPATIENT
Start: 2023-05-04 | End: 2023-05-05

## 2023-05-04 RX ORDER — CALCITRIOL 0.5 UG/1
0.5 CAPSULE ORAL DAILY
Refills: 0 | Status: DISCONTINUED | OUTPATIENT
Start: 2023-05-04 | End: 2023-05-05

## 2023-05-04 RX ORDER — MAGNESIUM OXIDE 400 MG ORAL TABLET 241.3 MG
400 TABLET ORAL DAILY
Refills: 0 | Status: DISCONTINUED | OUTPATIENT
Start: 2023-05-04 | End: 2023-05-05

## 2023-05-04 RX ORDER — DEXTROSE 50 % IN WATER 50 %
15 SYRINGE (ML) INTRAVENOUS ONCE
Refills: 0 | Status: DISCONTINUED | OUTPATIENT
Start: 2023-05-04 | End: 2023-05-05

## 2023-05-04 RX ORDER — DEXTROSE 50 % IN WATER 50 %
25 SYRINGE (ML) INTRAVENOUS ONCE
Refills: 0 | Status: DISCONTINUED | OUTPATIENT
Start: 2023-05-04 | End: 2023-05-05

## 2023-05-04 RX ORDER — PANTOPRAZOLE SODIUM 20 MG/1
40 TABLET, DELAYED RELEASE ORAL DAILY
Refills: 0 | Status: DISCONTINUED | OUTPATIENT
Start: 2023-05-04 | End: 2023-05-05

## 2023-05-04 RX ORDER — LEVOTHYROXINE SODIUM 125 MCG
25 TABLET ORAL DAILY
Refills: 0 | Status: DISCONTINUED | OUTPATIENT
Start: 2023-05-04 | End: 2023-05-05

## 2023-05-04 RX ORDER — FUROSEMIDE 40 MG
20 TABLET ORAL DAILY
Refills: 0 | Status: DISCONTINUED | OUTPATIENT
Start: 2023-05-04 | End: 2023-05-05

## 2023-05-04 RX ORDER — ASPIRIN/CALCIUM CARB/MAGNESIUM 324 MG
81 TABLET ORAL DAILY
Refills: 0 | Status: DISCONTINUED | OUTPATIENT
Start: 2023-05-04 | End: 2023-05-05

## 2023-05-04 RX ORDER — ERGOCALCIFEROL 1.25 MG/1
50000 CAPSULE ORAL DAILY
Refills: 0 | Status: DISCONTINUED | OUTPATIENT
Start: 2023-05-04 | End: 2023-05-04

## 2023-05-04 RX ORDER — ACETAMINOPHEN 500 MG
650 TABLET ORAL EVERY 6 HOURS
Refills: 0 | Status: DISCONTINUED | OUTPATIENT
Start: 2023-05-04 | End: 2023-05-05

## 2023-05-04 RX ORDER — COLCHICINE 0.6 MG
0.6 TABLET ORAL DAILY
Refills: 0 | Status: DISCONTINUED | OUTPATIENT
Start: 2023-05-04 | End: 2023-05-05

## 2023-05-04 RX ORDER — SODIUM CHLORIDE 9 MG/ML
1000 INJECTION INTRAMUSCULAR; INTRAVENOUS; SUBCUTANEOUS
Refills: 0 | Status: DISCONTINUED | OUTPATIENT
Start: 2023-05-04 | End: 2023-05-05

## 2023-05-04 RX ORDER — ASCORBIC ACID 60 MG
500 TABLET,CHEWABLE ORAL DAILY
Refills: 0 | Status: DISCONTINUED | OUTPATIENT
Start: 2023-05-04 | End: 2023-05-05

## 2023-05-04 RX ADMIN — HEPARIN SODIUM 5000 UNIT(S): 5000 INJECTION INTRAVENOUS; SUBCUTANEOUS at 18:10

## 2023-05-04 RX ADMIN — Medication 2 GRAM(S): at 18:12

## 2023-05-04 RX ADMIN — METHOCARBAMOL 750 MILLIGRAM(S): 500 TABLET, FILM COATED ORAL at 00:13

## 2023-05-04 RX ADMIN — SODIUM CHLORIDE 50 MILLILITER(S): 9 INJECTION INTRAMUSCULAR; INTRAVENOUS; SUBCUTANEOUS at 05:16

## 2023-05-04 RX ADMIN — Medication 20 MILLIGRAM(S): at 18:10

## 2023-05-04 RX ADMIN — Medication 1 TABLET(S): at 13:06

## 2023-05-04 RX ADMIN — Medication 2: at 11:56

## 2023-05-04 RX ADMIN — GABAPENTIN 300 MILLIGRAM(S): 400 CAPSULE ORAL at 00:13

## 2023-05-04 RX ADMIN — Medication 500 MILLIGRAM(S): at 13:05

## 2023-05-04 RX ADMIN — ERGOCALCIFEROL 50000 UNIT(S): 1.25 CAPSULE ORAL at 13:09

## 2023-05-04 RX ADMIN — SIMVASTATIN 10 MILLIGRAM(S): 20 TABLET, FILM COATED ORAL at 21:57

## 2023-05-04 RX ADMIN — Medication 4: at 18:09

## 2023-05-04 RX ADMIN — MAGNESIUM OXIDE 400 MG ORAL TABLET 400 MILLIGRAM(S): 241.3 TABLET ORAL at 13:22

## 2023-05-04 RX ADMIN — Medication 81 MILLIGRAM(S): at 13:06

## 2023-05-04 RX ADMIN — CALCITRIOL 0.5 MICROGRAM(S): 0.5 CAPSULE ORAL at 13:09

## 2023-05-04 RX ADMIN — Medication 0.6 MILLIGRAM(S): at 13:09

## 2023-05-04 RX ADMIN — Medication 100 MILLIGRAM(S): at 13:06

## 2023-05-04 RX ADMIN — Medication 25 MICROGRAM(S): at 18:10

## 2023-05-04 RX ADMIN — PANTOPRAZOLE SODIUM 40 MILLIGRAM(S): 20 TABLET, DELAYED RELEASE ORAL at 13:06

## 2023-05-04 RX ADMIN — Medication 25 GRAM(S): at 05:16

## 2023-05-04 RX ADMIN — Medication 1 TABLET(S): at 18:10

## 2023-05-04 RX ADMIN — INSULIN GLARGINE 35 UNIT(S): 100 INJECTION, SOLUTION SUBCUTANEOUS at 21:55

## 2023-05-04 NOTE — H&P ADULT - ATTENDING COMMENTS
89 yr old man with PMHx of multiple MI, TIA, HTN, CKD, MM (in remission as per pt), on ASA, DM, Gout, BPPV, BPH, Hypomagnesia , hypothyroidism, presents with fall. Per pt, patient has been falling due to loss of balance but denies LOC. Per daughter, patient has been experiencing multiple falls over the past few weeks, most recently two witnessed falls two days ago. Daughter states she heard the patient fall; unclear if he hit his head but denies any LOC and was able to assist him off the ground immediately after the incident. Daughter endorses patient has been increasingly weak. Pt denies any fevers, headaches, chest pain, dyspnea, nausea/vomiting/diarrhea, blood in stool, urinary symptoms or any other complaints.    #Falls in setting of generalized weakness  - likely due to anemia  - PT consult    #Acute on Chronic anemia - no overt GI Bleeding  Gastric wall thickening w/ upper abdominal lymphadenopathy (could also be 2/2 a component of CKD vs MM)  -GI on board : Family does not want any further workup of Gastric Mass. Palliative consulted.  - Iron studies, haptoglobin  - keep Hg above 7    #Cortical irregularity of the left scapula suspicious for acute fracture on CT scan.   Per Ortho, benign clinical exam, inconsistent with an acute fracture, may represent an old injury vs artifact  Weight bearing: WBAT LUE; if pain persists, transition to NWB LUE with sling immobilizer use  XR L shoulder/scapula pending       #Multiple Myeloma possibly flare-up  - Hypercalcemia-11.8  - Follows Dr Colmenares as outpt  - family requesting PET scan  - hem/onc consult noted:  not on any Rx given his overall poor PS  on procrit PRN  had hospice care discussion with Dr Colmenares yesterday - family refused  pls call for palliative care eval.      #CKD Stage III  - Baseline 1.7-2.0  in [facility],    - at baseline   - Nephrology Dr. King    #Elevated trops  #Hx of MI/ CAD  #Elevated BNP  - c/w aspirin, no longer on plavix   - No active chest pain, no leg edema  - EKG NSR  - Echo not on file   - trops 0.06 X2.   - Likely elevated in the setting of CKD and anemia. Less likely ACS     #HTN  - on metoprolol and losartan at home  - holding losartan as low BP's here    #DM  - On home tresiba 35-45 units and novolog 8 units prn TID  - Monitor FS. Start lantus 35 + sliding. Start TID dosing if consistently elevated FS.     #Gout  - c/w colchicine, renally dosed  - C/w allopurinol     #Hypothyroidism  - F/U TSH  - C/w synthroid     #Hx of hypomagnesia   - on home magnesium 500mg qd  - Can replete IV as needed     #BPPV  - No symptoms at this time  - Patient no longer taking meclizine    #Overactive bladder  - on gemtesa at home  - start myrbetriq here    #Misc  - GI prophylaxis: IV pantoprazole  - DVT prophylaxis: heparin  - Diet: soft and bite sized, dash/ carb consistent   - Dispo: home 89 yr old man with PMHx of multiple MI, TIA, HTN, CKD, MM (in remission as per pt), on ASA, DM, Gout, BPPV, BPH, Hypomagnesia , hypothyroidism, presents with fall. Per pt, patient has been falling due to loss of balance but denies LOC. Per daughter, patient has been experiencing multiple falls over the past few weeks, most recently two witnessed falls two days ago. Daughter states she heard the patient fall; unclear if he hit his head but denies any LOC and was able to assist him off the ground immediately after the incident. Daughter endorses patient has been increasingly weak. Pt denies any fevers, headaches, chest pain, dyspnea, nausea/vomiting/diarrhea, blood in stool, urinary symptoms or any other complaints.    #Falls in setting of generalized weakness  - likely due to anemia  - PT consult    #Acute on Chronic anemia - no overt GI Bleeding  Gastric wall thickening w/ upper abdominal lymphadenopathy (could also be 2/2 a component of CKD vs MM)  -GI on board : Family does not want any further workup of Gastric Mass. Palliative consulted.  - Iron studies, haptoglobin  - keep Hg above 7    #Cortical irregularity of the left scapula suspicious for acute fracture on CT scan.   Per Ortho, benign clinical exam, inconsistent with an acute fracture, may represent an old injury vs artifact  Weight bearing: WBAT LUE; if pain persists, transition to NWB LUE with sling immobilizer use  XR L shoulder/scapula :Degenerative changes of the left shoulder without evidence for acute   fracture or dislocation. Questionable nondisplaced fracture through the scapular body, better   appreciated on shoulder radiographs than dedicated scapular study.      #Multiple Myeloma possibly flare-up  - Hypercalcemia-11.8  - Follows Dr Colmenares as outpt  - family requesting PET scan  - hem/onc consult noted:  not on any Rx given his overall poor PS  on procrit PRN  had hospice care discussion with Dr Colmenares yesterday - family refused  pls call for palliative care eval.      #CKD Stage III  - Baseline 1.7-2.0  in [facility],    - at baseline   - Nephrology Dr. King    #Elevated trops  #Hx of MI/ CAD  #Elevated BNP  - c/w aspirin, no longer on plavix   - No active chest pain, no leg edema  - EKG NSR  - Echo not on file   - trops 0.06 X2.   - Likely elevated in the setting of CKD and anemia. Less likely ACS     #HTN  - on metoprolol and losartan at home  - holding losartan as low BP's here    #DM  - On home tresiba 35-45 units and novolog 8 units prn TID  - Monitor FS. Start lantus 35 + sliding. Start TID dosing if consistently elevated FS.     #Gout  - c/w colchicine, renally dosed  - C/w allopurinol     #Hypothyroidism  - F/U TSH  - C/w synthroid     #Hx of hypomagnesia   - on home magnesium 500mg qd  - Can replete IV as needed     #BPPV  - No symptoms at this time  - Patient no longer taking meclizine    #Overactive bladder  - on gemtesa at home  - start myrbetriq here    #Misc  - GI prophylaxis: IV pantoprazole  - DVT prophylaxis: heparin  - Diet: soft and bite sized, dash/ carb consistent   - Dispo: home

## 2023-05-04 NOTE — H&P ADULT - ASSESSMENT
#Anemia  - Iron studies, haptoglobin  - keep Hg above 7    #Multiple Myeloma possibly flare-up  - Hypercalcemia-11.8  - hem/onc consult    #CKD Stage III  - Baseline 1.7-2.0  in [facility],    - at baseline   - Nephrology Dr. King    #Elevated trops  #Hx of MI/ CAD  - c/w aspirin, no longer on plavix   - No active chest pain, trace edema on exam   - EKG NSR  - Echo not on file   - trops 0.06 X2.   - Likely elevated in the setting of CKD and anemia. Less likely ACS  - Cardiologist: Dr. Serna     #DM  - f/u a1c  - On home tresiba 35-45 units and lispro prn TID  - Monitor FS. Start lantus 35 + sliding. Start TID dosing if consistently elevated FS.     #Gout  - c/w colchicine, renal dose given TI  - C/w allopurinol     #Hypothyroidism  - F/U TSH  - C/w synthroid     #Hx of hypomagnesia   - on home magnesium 500mg qd  - check magnesium. Can replete IV as needed >> Mg 1.5 (7/1) >> will replete with IV Mg.    #BPPV  - No symptoms at this time  - Patient no longer taking meclizine    #BPH  - not on flomax, not complaining of symptoms     #Misc  - GI prophylaxis: not needed   - DVT prophylaxis: heparin  - Diet: soft and bite sized, dash/ carb consistent   - Code status  - Dispo: home  #Anemia  - Iron studies, haptoglobin  - keep Hg above 7    #Bone fracture     #CT scan stomach cancer?      #Multiple Myeloma possibly flare-up  - Hypercalcemia-11.8  - hem/onc consult    #CKD Stage III  - Baseline 1.7-2.0  in [facility],    - at baseline   - Nephrology Dr. King      #Elevated trops  #Hx of MI/ CAD  #Elevated BNP  - c/w aspirin, no longer on plavix   - No active chest pain, trace edema on exam   - EKG NSR  - Echo not on file   - trops 0.06 X2.   - Likely elevated in the setting of CKD and anemia. Less likely ACS  - Cardiologist: Dr. Serna     #DM  - f/u a1c  - On home tresiba 35-45 units and lispro prn TID  - Monitor FS. Start lantus 35 + sliding. Start TID dosing if consistently elevated FS.     #Gout  - c/w colchicine, renal dose given TI  - C/w allopurinol     #Hypothyroidism  - F/U TSH  - C/w synthroid     #Hx of hypomagnesia   - on home magnesium 500mg qd  - check magnesium. Can replete IV as needed >> Mg 1.5 (7/1) >> will replete with IV Mg.    #BPPV  - No symptoms at this time  - Patient no longer taking meclizine    #BPH  - not on flomax, not complaining of symptoms     #Misc  - GI prophylaxis: not needed   - DVT prophylaxis: heparin  - Diet: soft and bite sized, dash/ carb consistent   - Code status  - Dispo: home  89 yr old man with PMHx of multiple MI, TIA, HTN, CKD, MM (in remission as per pt), on ASA, DM, Gout, BPPV, BPH, Hypomagnesia , hypothyroidism, presents with fall. Per pt, patient has been falling due to loss of balance but denies LOC. Per daughter, patient has been experiencing multiple falls over the past few weeks, most recently two witnessed falls two days ago. Daughter states she heard the patient fall; unclear if he hit his head but denies any LOC and was able to assist him off the ground immediately after the incident. Daughter endorses patient has been increasingly weak. Pt denies any fevers, headaches, chest pain, dyspnea, nausea/vomiting/diarrhea, blood in stool, urinary symptoms or any other complaints.    #Falls in setting of generalized weakness  - likely due to anemia  - PT consult    #Anemia  - Possibly due to CKD vs GI bleed vs multiple myeloma  - GI consult  - Iron studies, haptoglobin  - keep Hg above 7    #Cortical irregularity of the left scapula suspicious for acute fracture  - Ortho suspects artifact or old fracture as physical exam benign  - Xray scapula pending     #CT scan stomach cancer?  - GI consult    #Multiple Myeloma possibly flare-up  - Hypercalcemia-11.8  - Follows Dr Colmenares as outpt  - hem/onc consult    #CKD Stage III  - Baseline 1.7-2.0  in [facility],    - at baseline   - Nephrology Dr. King      #Elevated trops  #Hx of MI/ CAD  #Elevated BNP  - c/w aspirin, no longer on plavix   - No active chest pain, trace edema on exam   - EKG NSR  - Echo not on file   - trops 0.06 X2.   - Likely elevated in the setting of CKD and anemia. Less likely ACS  - Cardiologist: Dr. Serna     #DM  - f/u a1c  - On home tresiba 35-45 units and lispro prn TID  - Monitor FS. Start lantus 35 + sliding. Start TID dosing if consistently elevated FS.     #Gout  - c/w colchicine, renal dose given TI  - C/w allopurinol     #Hypothyroidism  - F/U TSH  - C/w synthroid     #Hx of hypomagnesia   - on home magnesium 500mg qd  - check magnesium. Can replete IV as needed >> Mg 1.5 (7/1) >> will replete with IV Mg.    #BPPV  - No symptoms at this time  - Patient no longer taking meclizine    #BPH  - not on flomax, not complaining of symptoms     #Misc  - GI prophylaxis: not needed   - DVT prophylaxis: heparin  - Diet: soft and bite sized, dash/ carb consistent   - Code status  - Dispo: home    89 yr old man with PMHx of multiple MI, TIA, HTN, CKD, MM (in remission as per pt), on ASA, DM, Gout, BPPV, BPH, Hypomagnesia , hypothyroidism, presents with fall. Per pt, patient has been falling due to loss of balance but denies LOC. Per daughter, patient has been experiencing multiple falls over the past few weeks, most recently two witnessed falls two days ago. Daughter states she heard the patient fall; unclear if he hit his head but denies any LOC and was able to assist him off the ground immediately after the incident. Daughter endorses patient has been increasingly weak. Pt denies any fevers, headaches, chest pain, dyspnea, nausea/vomiting/diarrhea, blood in stool, urinary symptoms or any other complaints.    #Falls in setting of generalized weakness  - likely due to anemia  - PT consult    #Anemia  - Possibly due to CKD vs GI bleed vs multiple myeloma  - GI consult  - Iron studies, haptoglobin  - keep Hg above 7    #Cortical irregularity of the left scapula suspicious for acute fracture  - Ortho suspects artifact or old fracture as physical exam benign  - Xray scapula pending     #CT scan stomach cancer?  - GI consult    #Multiple Myeloma possibly flare-up  - Hypercalcemia-11.8  - Follows Dr Colmenares as outpt  - hem/onc consult  - family requesting PET scan    #CKD Stage III  - Baseline 1.7-2.0  in [facility],    - at baseline   - Nephrology Dr. King    #Elevated trops  #Hx of MI/ CAD  #Elevated BNP  - c/w aspirin, no longer on plavix   - No active chest pain, no leg edema  - EKG NSR  - Echo not on file   - trops 0.06 X2.   - Likely elevated in the setting of CKD and anemia. Less likely ACS     #HTN  - on metoprolol and losartan at home  - holding losartan as low BP's here    #DM  - On home tresiba 35-45 units and novolog 8 units prn TID  - Monitor FS. Start lantus 35 + sliding. Start TID dosing if consistently elevated FS.     #Gout  - c/w colchicine, renally dosed  - C/w allopurinol     #Hypothyroidism  - F/U TSH  - C/w synthroid     #Hx of hypomagnesia   - on home magnesium 500mg qd  - Can replete IV as needed     #BPPV  - No symptoms at this time  - Patient no longer taking meclizine    #BPH  - on gemtesa at home    #Misc  - GI prophylaxis: IV pantoprazole  - DVT prophylaxis: heparin  - Diet: soft and bite sized, dash/ carb consistent   - Dispo: home    89 yr old man with PMHx of multiple MI, TIA, HTN, CKD, MM (in remission as per pt), on ASA, DM, Gout, BPPV, BPH, Hypomagnesia , hypothyroidism, presents with fall. Per pt, patient has been falling due to loss of balance but denies LOC. Per daughter, patient has been experiencing multiple falls over the past few weeks, most recently two witnessed falls two days ago. Daughter states she heard the patient fall; unclear if he hit his head but denies any LOC and was able to assist him off the ground immediately after the incident. Daughter endorses patient has been increasingly weak. Pt denies any fevers, headaches, chest pain, dyspnea, nausea/vomiting/diarrhea, blood in stool, urinary symptoms or any other complaints.    #Falls in setting of generalized weakness  - likely due to anemia  - PT consult    #Anemia  - Possibly due to CKD vs GI bleed vs multiple myeloma  - GI consult  - Iron studies, haptoglobin  - keep Hg above 7    #Cortical irregularity of the left scapula suspicious for acute fracture  - Ortho suspects artifact or old fracture as physical exam benign  - Xray scapula pending     #CT scan stomach cancer?  - GI consult    #Multiple Myeloma possibly flare-up  - Hypercalcemia-11.8  - Follows Dr Colmenares as outpt  - hem/onc consult  - family requesting PET scan    #CKD Stage III  - Baseline 1.7-2.0  in [facility],    - at baseline   - Nephrology Dr. King    #Elevated trops  #Hx of MI/ CAD  #Elevated BNP  - c/w aspirin, no longer on plavix   - No active chest pain, no leg edema  - EKG NSR  - Echo not on file   - trops 0.06 X2.   - Likely elevated in the setting of CKD and anemia. Less likely ACS     #HTN  - on metoprolol and losartan at home  - holding losartan as low BP's here    #DM  - On home tresiba 35-45 units and novolog 8 units prn TID  - Monitor FS. Start lantus 35 + sliding. Start TID dosing if consistently elevated FS.     #Gout  - c/w colchicine, renally dosed  - C/w allopurinol     #Hypothyroidism  - F/U TSH  - C/w synthroid     #Hx of hypomagnesia   - on home magnesium 500mg qd  - Can replete IV as needed     #BPPV  - No symptoms at this time  - Patient no longer taking meclizine    #Overactive bladder  - on gemtesa at home  - start myrbetriq here    #Misc  - GI prophylaxis: IV pantoprazole  - DVT prophylaxis: heparin  - Diet: soft and bite sized, dash/ carb consistent   - Dispo: home

## 2023-05-04 NOTE — H&P ADULT - PATIENT'S GENDER IDENTITY
Male though per previous notes were present previously/there is no prior EKG available for comparison

## 2023-05-04 NOTE — PATIENT PROFILE ADULT - ...
Person: Pt asleep in chair, Pt woke up when AT knocked and said hello.  Process: Pt too tired to work on bottle today.  AT left baking instructions for Pt to finish bottle and bake at home.  Product: Pt ready for discharge.  
Person: Pt sitting up in chair.  Open to AT introducing art therapy.  Process: Pt mentioned that someone was bringing her coloring materials and also mentioned liking bright colored materials.  AT mentioned bottle of hope project and the colorful cem used with it.  Pt open to trying it.  Pt needed help warming up cem but was then able to take cem piece from AT's hand and push it onto the bottle.  Covered about half of the bottle and then said she was tired.    Product: AT will come back tomorrow to help finish bottle.  
04-May-2023 04:49:17

## 2023-05-04 NOTE — PATIENT PROFILE ADULT - FALL HARM RISK - HARM RISK INTERVENTIONS
Assistance with ambulation/Assistance OOB with selected safe patient handling equipment/Communicate Risk of Fall with Harm to all staff/Discuss with provider need for PT consult/Monitor gait and stability/Reinforce activity limits and safety measures with patient and family/Tailored Fall Risk Interventions/Visual Cue: Yellow wristband and red socks/Bed in lowest position, wheels locked, appropriate side rails in place/Call bell, personal items and telephone in reach/Instruct patient to call for assistance before getting out of bed or chair/Non-slip footwear when patient is out of bed/[place] to call system/Physically safe environment - no spills, clutter or unnecessary equipment/Purposeful Proactive Rounding/Room/bathroom lighting operational, light cord in reach

## 2023-05-04 NOTE — ED ADULT NURSE NOTE - OBJECTIVE STATEMENT
PT presents to the ED w/ c/o multiple falls this week. Pt last fall was an unwitnessed fall yesterday morning. Pt was attempting to get back to bed from commode, stood up and fell down. Daughter endorses pt takes aspirin on a daily basis. Pt A&Ox2 in triage. Pt hypotensive in triage, brought to crit evaluated by MD Brar. No trauma alert activated as per MD  PMHx anemia, multiple myeloma, DM

## 2023-05-04 NOTE — H&P ADULT - NSHPLABSRESULTS_GEN_ALL_CORE
LABS:  05-03 @ 20:44 Creatine 53 U/L [0 - 225]  cret                        6.8    9.66  )-----------( 283      ( 04 May 2023 05:43 )             21.4     05-04    134<L>  |  104  |  88<HH>  ----------------------------<  198<H>  4.4   |  20  |  1.6<H>    Ca    10.8<H>      04 May 2023 05:43  Phos  4.1     05-03  Mg     1.7     05-04    TPro  4.7<L>  /  Alb  2.7<L>  /  TBili  1.3<H>  /  DBili  x   /  AST  40  /  ALT  33  /  AlkPhos  95  05-04    PT/INR - ( 03 May 2023 20:44 )   PT: 12.40 sec;   INR: 1.08 ratio         PTT - ( 03 May 2023 20:44 )  PTT:29.6 sec

## 2023-05-04 NOTE — H&P ADULT - HISTORY OF PRESENT ILLNESS
89 yr old man with PMHx of multiple MI, TIA, HTN, CKD, MM (in remission as per pt), on ASA, DM, Gout, BPPV, BPH, Hypomagnesia , hypothyroidism, presents with fall.     Per daughter, patient has been experiencing multiple falls over the past few weeks, most recently two witnessed falls yesterday. Daughter states she heard the patient fall; unclear if he hit his head but denies any LOC and was able to assist him off the ground immediately after the incident. Daughter endorses patient has been increasingly weak but otherwise denies any fevers, chest pain, dyspnea, nausea/vomiting/diarrhea, blood in stool, urinary symptoms or any other complaints.    In the ED, VS significant for 107/57, the rest were normal  Labs significant for WBC 11.27, Hg 6.1, BUN/Creat 84/1.8, Calcium -11.3, trop- 0.06, BNP- 881  Imaging- CT Scan: Cortical irregularity of the left scapula suspicious for acute fracture. Wall thickening ofthe distal stomach, increased with increased size   adjacent lymph nodes concerning for malignancy. Further evaluation   recommended.  Patient received NS bolus, 1 unit RBC's, methocarbamol, magnesium.  Admitted for weakness and suspected multiple myeloma relapse 89 yr old man with PMHx of multiple MI, TIA, HTN, CKD, MM (in remission as per pt), on ASA, DM, Gout, BPPV, BPH, Hypomagnesia , hypothyroidism, presents with fall. Per pt, patient has been falling due to loss of balance but denies LOC. Per daughter, patient has been experiencing multiple falls over the past few weeks, most recently two witnessed falls two days ago. Daughter states she heard the patient fall; unclear if he hit his head but denies any LOC and was able to assist him off the ground immediately after the incident. Daughter endorses patient has been increasingly weak. Pt denies any fevers, chest pain, dyspnea, nausea/vomiting/diarrhea, blood in stool, urinary symptoms or any other complaints.    In the ED, VS significant for 107/57, the rest were normal  Labs significant for WBC 11.27, Hg 6.1, BUN/Creat 84/1.8, Calcium -11.3, trop- 0.06, BNP- 881  Imaging- CT Scan: Cortical irregularity of the left scapula suspicious for acute fracture. Wall thickening ofthe distal stomach, increased with increased size   adjacent lymph nodes concerning for malignancy. Further evaluation   recommended.  Patient received NS bolus, 1 unit RBC's, methocarbamol, magnesium.  Admitted for weakness and suspected multiple myeloma relapse 89 yr old man with PMHx of multiple MI, TIA, HTN, CKD, MM (in remission as per pt), on ASA, DM, Gout, BPPV, BPH, Hypomagnesia , hypothyroidism, presents from home with falls. Per pt, patient has been falling due to loss of balance but denies LOC. Per daughter, patient has been experiencing multiple falls over the past few weeks, most recently two witnessed falls two days ago. Daughter states she heard the patient fall; unclear if he hit his head but denies any LOC and was able to assist him off the ground immediately after the incident. Daughter endorses patient has been increasingly weak. He is stubborn and likes to get up by himself without assistance and that is when he falls. He has been mostly in bed but wants to try and walk although he requires significant assistance. Pt denies any fevers, chest pain, dyspnea, nausea/vomiting/diarrhea, blood in stool, urinary symptoms or any other complaints.    In the ED, VS significant for 107/57, the rest were normal  Labs significant for WBC 11.27, Hg 6.1, BUN/Creat 84/1.8, Calcium -11.3, trop- 0.06, BNP- 881  Imaging- CT Scan: Cortical irregularity of the left scapula suspicious for acute fracture. Wall thickening ofthe distal stomach, increased with increased size   adjacent lymph nodes concerning for malignancy. Further evaluation   recommended.  Patient received NS bolus, 1 unit RBC's, methocarbamol, magnesium.  Admitted for weakness and suspected multiple myeloma relapse

## 2023-05-04 NOTE — PHYSICAL THERAPY INITIAL EVALUATION ADULT - SPECIFY REASON(S)
Pt currently receiving blood transfusion, has scapula xray pending. Will f/u for PT when medically appropriate. 89M PMH HTN, HLD, CKD4, chronic HFrEF (LVEF 30% Dec 2021), CAD s/p PCI, aortic stenosis s/p AVR, chronic atrial fibrillation s/p PPM, hypothyroidism, GIB (now off warfarin x 8 months), colon cancer, prostate cancer, and ?coagulation d/o who presented from Dr. Chandler's office for wbc 1.4 found to be pancytopenic/neutropenic, pending BM Bx.      Patient was seen and examined today. Denied any complaints.     # Pancytopenia:   Normal B12/folate, iron panel with no evidence of ATIF, Hep C non-reactive.   Follow bone marrow biopsy result.   If develop fever, obtain pan-culture and start wide-spectrum antibiotics.  Hematology team would like to monitor the patient in the hospital till biopsy result available.

## 2023-05-04 NOTE — PATIENT PROFILE ADULT - NSPROPOAPRESSUREINJURY_GEN_A_NUR
Patient called wanting to discuss a procedure that she had with the doctor and the process to have it covered by insurance. Patient says that insurance is denying coverage and will nee additional information in order for her to appeal it.     Please call back.    no

## 2023-05-04 NOTE — CONSULT NOTE ADULT - SUBJECTIVE AND OBJECTIVE BOX
Gastroenterology Consultation:    Patient is a 89y old  Male who presents with a chief complaint of multiple mechanical falls and possible relapse of multiple myeloma (04 May 2023 07:51)        Admitted on: 05-04-23      HPI:    89 yr old man with PMHx of multiple MI, TIA, HTN, CKD, MM (in remission as per pt), on ASA, DM, Gout, BPPV, BPH, Hypomagnesia , hypothyroidism, presents from home with falls. Per pt, patient has been falling due to loss of balance but denies LOC. Per daughter, patient has been experiencing multiple falls over the past few weeks, most recently two witnessed falls two days ago. Daughter states she heard the patient fall; unclear if he hit his head but denies any LOC and was able to assist him off the ground immediately after the incident. Daughter endorses patient has been increasingly weak. Pt was noted to have hb of 6.1 (10 baseline) without overt evidence of bleeding. Trauma workup showed gastric wall thickening with prominent upper abdominal lymphadenopathy largest measuring 1.4cm.    Prior EGD: none in system    Prior Colonoscopy: none in system      PAST MEDICAL & SURGICAL HISTORY:  DM (diabetes mellitus)      H/O vertigo      Acute myocardial infarction      History of TIAs  left inner ear damage      Gout      Hypothyroidism  as per pt's daughter my dad had his thyroid levels drawn  within 2-3 months- the medication dose remained the same      Multiple myeloma      [place] (hard of hearing)      HTN (hypertension)      H/O carotid endarterectomy      H/O cataract extraction      History of rectal polypectomy            FAMILY HISTORY:  denies family hx of gastric or colon cancer per daughter    Social History:  Tobacco: former smoker per daughter  Alcohol: denies   Drugs: denies    Home Medications:  allopurinol 100 mg oral tablet: 1 tab(s) orally once a day (30 Jun 2022 16:27)  aspirin 81 mg oral delayed release tablet: 1 tab(s) orally once a day (27 Sep 2021 12:11)  calcitriol 0.5 mcg oral capsule: 1 cap(s) orally once a day (27 Sep 2021 12:11)  colchicine 0.6 mg oral tablet: 1 tab(s) orally once a day (30 Jun 2022 16:28)  famotidine 40 mg oral tablet: 1 tab(s) orally once a day (at bedtime) (27 Sep 2021 12:11)  Fish Oil 1000 mg oral capsule: 1 cap(s) orally once a day (27 Sep 2021 12:11)  furosemide 20 mg oral tablet: 1 tab(s) orally once a day. Take an extra tab if weight gain of 3lb or more in 1 day (01 Jul 2022 16:11)  levothyroxine 25 mcg (0.025 mg) oral capsule: 1 cap(s) orally once a day (27 Sep 2021 12:11)  Lipotropic with Multivitamins oral tablet: 1 tab(s) orally once a day (27 Sep 2021 12:11)  losartan 50 mg oral tablet: 1 tab(s) orally once a day (30 Jun 2022 16:28)  metoprolol succinate 50 mg oral tablet, extended release: 1 tab(s) orally once a day (27 Sep 2021 12:11)  NovoLOG 100 units/mL injectable solution: injectable 3 times a day (27 Sep 2021 12:11)  Probiotic Formula oral capsule: 1 cap(s) orally once a day (27 Sep 2021 12:11)  simvastatin 10 mg oral tablet: 1 tab(s) orally once a day (at bedtime) (27 Sep 2021 12:11)  Tresiba 100 units/mL subcutaneous solution: subcutaneous once a day (at bedtime) (27 Sep 2021 12:11)  Vitamin C 500 mg oral capsule: 1 cap(s) orally once a day (27 Sep 2021 12:11)  Vitamin D2 1.25 mg (50,000 intl units) oral capsule: 1 cap(s) orally once a week (27 Sep 2021 12:11)        MEDICATIONS  (STANDING):  allopurinol 100 milliGRAM(s) Oral daily  ascorbic acid 500 milliGRAM(s) Oral daily  aspirin enteric coated 81 milliGRAM(s) Oral daily  calcitriol   Capsule 0.5 MICROGram(s) Oral daily  colchicine 0.6 milliGRAM(s) Oral daily  dextrose 5%. 1000 milliLiter(s) (50 mL/Hr) IV Continuous <Continuous>  dextrose 5%. 1000 milliLiter(s) (100 mL/Hr) IV Continuous <Continuous>  dextrose 50% Injectable 25 Gram(s) IV Push once  dextrose 50% Injectable 12.5 Gram(s) IV Push once  dextrose 50% Injectable 25 Gram(s) IV Push once  ergocalciferol 06525 Unit(s) Oral every week  furosemide    Tablet 20 milliGRAM(s) Oral daily  glucagon  Injectable 1 milliGRAM(s) IntraMuscular once  heparin   Injectable 5000 Unit(s) SubCutaneous every 12 hours  insulin glargine Injectable (LANTUS) 35 Unit(s) SubCutaneous at bedtime  insulin lispro (ADMELOG) corrective regimen sliding scale   SubCutaneous three times a day before meals  lactobacillus acidophilus 1 Tablet(s) Oral every 12 hours  levothyroxine 25 MICROGram(s) Oral daily  magnesium oxide 400 milliGRAM(s) Oral daily  metoprolol succinate ER 50 milliGRAM(s) Oral daily  multivitamin 1 Tablet(s) Oral daily  omega-3-Acid Ethyl Esters 2 Gram(s) Oral two times a day  pantoprazole  Injectable 40 milliGRAM(s) IV Push daily  simvastatin 10 milliGRAM(s) Oral at bedtime  sodium chloride 0.9%. 1000 milliLiter(s) (50 mL/Hr) IV Continuous <Continuous>  vibegron 75 milliGRAM(s) 75 milliGRAM(s) Oral daily    MEDICATIONS  (PRN):  acetaminophen     Tablet .. 650 milliGRAM(s) Oral every 6 hours PRN Temp greater or equal to 38C (100.4F), Mild Pain (1 - 3)  dextrose Oral Gel 15 Gram(s) Oral once PRN Blood Glucose LESS THAN 70 milliGRAM(s)/deciliter  melatonin 3 milliGRAM(s) Oral at bedtime PRN Insomnia      Allergies  No Known Allergies      Review of Systems:   Constitutional:  No Fever, No Chills  ENT/Mouth:  No Hearing Changes,  No Difficulty Swallowing  Eyes:  No Eye Pain, No Vision Changes  Cardiovascular:  No Chest Pain, No Palpitations  Respiratory:  No Cough, No Dyspnea  Gastrointestinal:  As described in HPI          Physical Examination:  T(C): 36.8 (05-04-23 @ 04:35), Max: 36.8 (05-04-23 @ 04:35)  HR: 80 (05-04-23 @ 04:35) (79 - 86)  BP: 97/46 (05-04-23 @ 04:35) (87/48 - 129/58)  RR: 19 (05-04-23 @ 04:35) (18 - 19)  SpO2: 98% (05-04-23 @ 04:35) (98% - 99%)  Height (cm): 167.6 (05-04-23 @ 02:00)  Weight (kg): 77.1 (05-03-23 @ 19:31)    05-03-23 @ 07:01  -  05-04-23 @ 07:00  --------------------------------------------------------  IN: 537 mL / OUT: 300 mL / NET: 237 mL    05-04-23 @ 07:01  -  05-04-23 @ 12:51  --------------------------------------------------------  IN: 0 mL / OUT: 200 mL / NET: -200 mL          GENERAL: AAOx2, no acute distress.  HEAD:  Atraumatic, Normocephalic  EYES: conjunctiva and sclera clear  CHEST/LUNG: Clear to auscultation bilaterally; No wheeze, rhonchi, or rales  HEART: Regular rate and rhythm; normal S1, S2, No murmurs.  ABDOMEN: Soft, nontender, nondistended; Bowel sounds present. AKASH: green stool  SKIN: Intact, no jaundice        Data:                        6.8    9.66  )-----------( 283      ( 04 May 2023 05:43 )             21.4     Hgb Trend:  6.8  05-04-23 @ 05:43  6.1  05-03-23 @ 20:44      05-03-23 @ 07:01  -  05-04-23 @ 07:00  --------------------------------------------------------  IN: 337 mL      05-04    134<L>  |  104  |  88<HH>  ----------------------------<  198<H>  4.4   |  20  |  1.6<H>    Ca    10.8<H>      04 May 2023 05:43  Phos  4.1     05-03  Mg     1.7     05-04    TPro  4.7<L>  /  Alb  2.7<L>  /  TBili  1.3<H>  /  DBili  x   /  AST  40  /  ALT  33  /  AlkPhos  95  05-04    Liver panel trend:  TBili 1.3   /   AST 40   /   ALT 33   /   AlkP 95   /   Tptn 4.7   /   Alb 2.7    /   DBili --      05-04  TBili 0.8   /   AST 41   /   ALT 37   /   AlkP 103   /   Tptn 5.2   /   Alb 3.1    /   DBili --      05-03      PT/INR - ( 03 May 2023 20:44 )   PT: 12.40 sec;   INR: 1.08 ratio         PTT - ( 03 May 2023 20:44 )  PTT:29.6 sec        Radiology:  CT Abdomen and Pelvis No Cont:   ACC: [id number] EXAM:  CT CHEST   ORDERED BY: UNIQUE URIBE     ACC: [id number] EXAM:  CT ABDOMEN AND PELVIS   ORDERED BY: UNIQUE URIBE     PROCEDURE DATE:  05/03/2023          INTERPRETATION:  CLINICAL STATEMENT: Fall    TECHNIQUE:  Contiguous axial CT images were obtained of the abdomen and   pelvis without intravenous contrast administration. Oral contrast was not   administered. Reformatted images in the coronal and sagittal planes were   acquired.    COMPARISON CT: 12/16/2022    FINDINGS:  Evaluationof solid organs and vascular structures is limited due to lack   of intravenous contrast.    AIRWAYS, LUNGS AND PLEURA: Patent central tracheobronchial tree. No focal   consolidation pleural effusion or pneumothorax. 3 mm left lower lobe   timmy-fissural nodule. Bilateral bronchial wall thickening reticulation   and subsegmental atelectasis.    MEDIASTINUM: There are nonspecific subcentimeter mediastinal lymph nodes    HEART AND VESSELS: The heart is normal in size.  There is no pericardial   effusion. Drains to the left brachiocephalic vein    HEPATOBILIARY: Scattered punctate calcifications, likely calcified   granulomas.. Contracted gallbladder. Cholelithiasis    SPLEEN: Unremarkable.    PANCREAS: Pancreatic head and tail hypodensities without significant   change    ADRENAL GLANDS: Unremarkable.    KIDNEYS: No hydronephrosis. Previously seen bilateral renal cysts poorly   delineated due to streak artifact.    ABDOMINOPELVIC NODES: Increased size upper abdominal lymph nodes now   measuring up to 1.4 cm, 4/257    PELVIC ORGANS: Unremarkable.    PERITONEUM/MESENTERY/BOWEL: No bowel obstruction, ascites or free   intraperitoneal air. Wall thickening of the distal stomach, increased .    BONES/SOFT TISSUES: Degenerative changes of the spine. Cortical   irregularity of the left scapula suspicious for acute fracture. Small   fat-containing ventral hernia. There is osteopenia.    IMPRESSION:  Cortical irregularity of the left scapula suspicious for acute fracture.    Wall thickening ofthe distal stomach, increased with increased size   adjacent lymph nodes concerning for malignancy. Further evaluation   recommended.    --- End of Report ---            ROSARIO AGRAWAL MD; Attending Radiologist  This document has been electronically signed. May  3 2023 11:23PM (05-03-23 @ 22:57)

## 2023-05-04 NOTE — CONSULT NOTE ADULT - SUBJECTIVE AND OBJECTIVE BOX
Orthopaedic Surgery Consult Note    89yo Male SUSHMAD presents to the ED brought in by family s/p multiple falls. He has had increasing falls over the past 1-2 weeks, with 2 falls (unwitnessed) yesterday. Pt states he falls, unclear why, he denies any  pain to any part of his body, denies b/l shoulder/arm pain, hip pain, groin pain. Denies HT/LOC.     PMH/PSH: DM, vertigo, MI, gout, hypothyroidism, myeloma    Medications  magnesium sulfate  IVPB 2 Gram(s) IV Intermittent Once    Home Medications:  allopurinol 100 mg oral tablet: 1 tab(s) orally once a day (30 Jun 2022 16:27)  aspirin 81 mg oral delayed release tablet: 1 tab(s) orally once a day (27 Sep 2021 12:11)  calcitriol 0.5 mcg oral capsule: 1 cap(s) orally once a day (27 Sep 2021 12:11)  colchicine 0.6 mg oral tablet: 1 tab(s) orally once a day (30 Jun 2022 16:28)  famotidine 40 mg oral tablet: 1 tab(s) orally once a day (at bedtime) (27 Sep 2021 12:11)  Fish Oil 1000 mg oral capsule: 1 cap(s) orally once a day (27 Sep 2021 12:11)  furosemide 20 mg oral tablet: 1 tab(s) orally once a day. Take an extra tab if weight gain of 3lb or more in 1 day (01 Jul 2022 16:11)  levothyroxine 25 mcg (0.025 mg) oral capsule: 1 cap(s) orally once a day (27 Sep 2021 12:11)  Lipotropic with Multivitamins oral tablet: 1 tab(s) orally once a day (27 Sep 2021 12:11)  losartan 50 mg oral tablet: 1 tab(s) orally once a day (30 Jun 2022 16:28)  metoprolol succinate 50 mg oral tablet, extended release: 1 tab(s) orally once a day (27 Sep 2021 12:11)  NovoLOG 100 units/mL injectable solution: injectable 3 times a day (27 Sep 2021 12:11)  Probiotic Formula oral capsule: 1 cap(s) orally once a day (27 Sep 2021 12:11)  simvastatin 10 mg oral tablet: 1 tab(s) orally once a day (at bedtime) (27 Sep 2021 12:11)  Tresiba 100 units/mL subcutaneous solution: subcutaneous once a day (at bedtime) (27 Sep 2021 12:11)  Vitamin C 500 mg oral capsule: 1 cap(s) orally once a day (27 Sep 2021 12:11)  Vitamin D2 1.25 mg (50,000 intl units) oral capsule: 1 cap(s) orally once a week (27 Sep 2021 12:11)        Allergies  No Known Allergies        T(C): 36.7 (05-04-23 @ 00:55), Max: 36.7 (05-03-23 @ 19:31)  HR: 79 (05-04-23 @ 00:55) (79 - 86)  BP: 98/47 (05-04-23 @ 00:55) (87/48 - 129/58)  RR: 18 (05-04-23 @ 00:55) (18 - 18)  SpO2: 99% (05-04-23 @ 00:55) (98% - 99%)    P/E:  AOx3, NAD  Non-labored breathing    BL UE  Skin intact  No swelling/erythema/ecchymosis noted  No deformity/laceration/abrasion noted  Full painless AROM shoulders, elbows, wrists  NTTP scapular spine or body, ACJ, acromion, clavicle, GHJ  Compartments soft and compressible, no pain w/ passive stretch of digits  SILT Ax/LABCN/R/M/U  AIN/PIN/U intact; Firing deltoid/biceps/triceps/wf/we/epl/fpl/fdp/io   Radial pulse 2+, cap refill <2    Pelvis: stable to stress    B/L LE  Skin intact  No swelling/erythema/ecchymosis noted  No deformity/laceration/abrasion noted  Painless hip ROM  NTTP  Able to SLR bilaterally  Compartments soft and compressible, no pain w/ passive stretch of digits  SILT SP/DP/T/SURAL/  Firing TA/EHL/FHL/GS  DP pulse 2+, cap refill <2        Labs                        6.1    11.27 )-----------( 356      ( 03 May 2023 20:44 )             19.7     05-03    132<L>  |  99  |  84<HH>  ----------------------------<  211<H>  4.5   |  20  |  1.8<H>    Ca    11.3<H>      03 May 2023 20:44  Phos  4.1     05-03  Mg     1.6     05-03    TPro  5.2<L>  /  Alb  3.1<L>  /  TBili  0.8  /  DBili  x   /  AST  41  /  ALT  37  /  AlkPhos  103  05-03    LIVER FUNCTIONS - ( 03 May 2023 20:44 )  Alb: 3.1 g/dL / Pro: 5.2 g/dL / ALK PHOS: 103 U/L / ALT: 37 U/L / AST: 41 U/L / GGT: x           PT/INR - ( 03 May 2023 20:44 )   PT: 12.40 sec;   INR: 1.08 ratio         PTT - ( 03 May 2023 20:44 )  PTT:29.6 sec    Imaging:  No XR  CT chest demonstrates cortical irregularlity over L scapular body concerning for fx of scapular body      A/P:  89yo Male w/ CT findings suggestive of a L scapular body fx but with bening clinical exam, inconsistent with an acute fracture, may represent an old injury vs artifact    Weight bearing: WBAT LUE; if pain persists, transition to NWB LUE with sling immobilizer use  XR L shoulder/scapula  Pain control  Ice/elevation  Recommend eval by PT/OT for fall prevention, precautions to be taken around home to prevent fall-related sequelae                                                                                                                                                  t  Follow-up w/ Dr. Degroot in 1-2 weeks, please call [phone] to schedule an appointment  Return to ED with uncontrolled pain/bleeding/fever/chills/numbness/tingling/cool extremity/inability to move extremity

## 2023-05-04 NOTE — CONSULT NOTE ADULT - ASSESSMENT
89 yr old man with PMHx of multiple MI, TIA on ASA, HTN, CKD, MM (in remission as per pt), on ASA, DM, Gout, BPPV, BPH, Hypomagnesia , hypothyroidism, presents from home with falls.     #Acute on Chronic anemia - no overt GI Bleeding  Gastric wall thickening w/ upper abdominal lymphadenopathy -  -GI on board   - s/p tfx   -monitor hb   -tfx to keep hb >7    #Multiple myeloma   not on any Rx given his overall poor PS  on procrit PRN  had hospice care discussion with Dr Colmenares yesterday   family refused  pls call for palliative care eval.  PT eval     cont other supportive care.  overall prognosis is poor  will f/u

## 2023-05-04 NOTE — CONSULT NOTE ADULT - ASSESSMENT
89 yr old man with PMHx of multiple MI, TIA on ASA, HTN, CKD, MM (in remission as per pt), on ASA, DM, Gout, BPPV, BPH, Hypomagnesia , hypothyroidism, presents from home with falls. Per pt, patient has been falling due to loss of balance but denies LOC. Per daughter, patient has been experiencing multiple falls over the past few weeks, most recently two witnessed falls two days ago. Daughter states she heard the patient fall; unclear if he hit his head but denies any LOC and was able to assist him off the ground immediately after the incident. Daughter endorses patient has been increasingly weak. Pt was noted to have hb of 6.1 (10 baseline) without overt evidence of bleeding. Trauma workup showed gastric wall thickening with prominent upper abdominal lymphadenopathy largest measuring 1.4cm.    #Gastric wall thickening w/ upper abdominal lymphadenopathy - r/o mass  #Acute on Chronic anemia - no overt GI BLeeding  - Hb 6.1 (baseline 10) - given 1 unit 6.8  - HD stable  - AKASH: green stool    Plan  - case discussed with daughter (carlitos) over the phone in detail. she does not want any further workup or investigation for possible gastric mass. Given pt's age and co-morbidities she prefers comfort measures only  - GOC discussion  - palliative eval  - Target Hb >8   89 yr old man with PMHx of multiple MI, TIA on ASA, HTN, CKD, MM (in remission as per pt), on ASA, DM, Gout, BPPV, BPH, Hypomagnesia , hypothyroidism, presents from home with falls. Per pt, patient has been falling due to loss of balance but denies LOC. Per daughter, patient has been experiencing multiple falls over the past few weeks, most recently two witnessed falls two days ago. Daughter states she heard the patient fall; unclear if he hit his head but denies any LOC and was able to assist him off the ground immediately after the incident. Daughter endorses patient has been increasingly weak. Pt was noted to have hb of 6.1 (10 baseline) without overt evidence of bleeding. Trauma workup showed gastric wall thickening with prominent upper abdominal lymphadenopathy largest measuring 1.4cm.    #Gastric wall thickening w/ upper abdominal lymphadenopathy - r/o mass  #Acute on Chronic anemia - no overt GI BLeeding  - Hb 6.1 (baseline 10) - given 1 unit 6.8  - HD stable  - AKASH: green stool    Plan  - case discussed with daughter (Iman) over the phone in detail. she does not want any further workup or investigation for possible gastric mass. Given pt's age and co-morbidities she prefers comfort measures only  - GOC discussion  - palliative eval  - Target Hb >8

## 2023-05-04 NOTE — H&P ADULT - NSHPPHYSICALEXAM_GEN_ALL_CORE
LOS:     VITALS:   T(C): 36.8 (05-04-23 @ 04:35), Max: 36.8 (05-04-23 @ 04:35)  HR: 80 (05-04-23 @ 04:35) (79 - 86)  BP: 97/46 (05-04-23 @ 04:35) (87/48 - 129/58)  RR: 19 (05-04-23 @ 04:35) (18 - 19)  SpO2: 98% (05-04-23 @ 04:35) (98% - 99%)    GENERAL: NAD, lying in bed comfortably  HEAD:  Atraumatic, Normocephalic  EYES: EOMI, PERRLA, conjunctiva and sclera clear  ENT: Moist mucous membranes  NECK: Supple, No JVD  CHEST/LUNG: Clear to auscultation bilaterally; No rales, rhonchi, wheezing, or rubs. Unlabored respirations  HEART: Regular rate and rhythm; No murmurs, rubs, or gallops  ABDOMEN: BSx4; Soft, nontender, nondistended  EXTREMITIES:  2+ Peripheral Pulses, brisk capillary refill. No clubbing, cyanosis, or edema  NERVOUS SYSTEM:  A&Ox3, no focal deficits   SKIN: No rashes or lesions

## 2023-05-04 NOTE — ED ADULT NURSE NOTE - NSICDXPASTMEDICALHX_GEN_ALL_CORE_FT
PAST MEDICAL HISTORY:  Acute myocardial infarction     DM (diabetes mellitus)     Gout     H/O vertigo     History of TIAs left inner ear damage    [place] (hard of hearing)     HTN (hypertension)     Hypothyroidism as per pt's daughter my dad had his thyroid levels drawn  within 2-3 months- the medication dose remained the same    Multiple myeloma

## 2023-05-05 ENCOUNTER — TRANSCRIPTION ENCOUNTER (OUTPATIENT)
Age: 88
End: 2023-05-05

## 2023-05-05 VITALS — HEIGHT: 66 IN | WEIGHT: 169.98 LBS

## 2023-05-05 DIAGNOSIS — C90.00 MULTIPLE MYELOMA NOT HAVING ACHIEVED REMISSION: ICD-10-CM

## 2023-05-05 DIAGNOSIS — D64.9 ANEMIA, UNSPECIFIED: ICD-10-CM

## 2023-05-05 DIAGNOSIS — Z51.5 ENCOUNTER FOR PALLIATIVE CARE: ICD-10-CM

## 2023-05-05 DIAGNOSIS — Z71.89 OTHER SPECIFIED COUNSELING: ICD-10-CM

## 2023-05-05 DIAGNOSIS — K31.89 OTHER DISEASES OF STOMACH AND DUODENUM: ICD-10-CM

## 2023-05-05 LAB
A1C WITH ESTIMATED AVERAGE GLUCOSE RESULT: 6.4 % — HIGH (ref 4–5.6)
ALBUMIN SERPL ELPH-MCNC: 2.6 G/DL — LOW (ref 3.5–5.2)
ALP SERPL-CCNC: 92 U/L — SIGNIFICANT CHANGE UP (ref 30–115)
ALT FLD-CCNC: 26 U/L — SIGNIFICANT CHANGE UP (ref 0–41)
ANION GAP SERPL CALC-SCNC: 10 MMOL/L — SIGNIFICANT CHANGE UP (ref 7–14)
AST SERPL-CCNC: 28 U/L — SIGNIFICANT CHANGE UP (ref 0–41)
BASOPHILS # BLD AUTO: 0.05 K/UL — SIGNIFICANT CHANGE UP (ref 0–0.2)
BASOPHILS NFR BLD AUTO: 0.6 % — SIGNIFICANT CHANGE UP (ref 0–1)
BILIRUB SERPL-MCNC: 0.6 MG/DL — SIGNIFICANT CHANGE UP (ref 0.2–1.2)
BLD GP AB SCN SERPL QL: SIGNIFICANT CHANGE UP
BUN SERPL-MCNC: 74 MG/DL — CRITICAL HIGH (ref 10–20)
CALCIUM SERPL-MCNC: 10.2 MG/DL — SIGNIFICANT CHANGE UP (ref 8.4–10.5)
CHLORIDE SERPL-SCNC: 105 MMOL/L — SIGNIFICANT CHANGE UP (ref 98–110)
CO2 SERPL-SCNC: 20 MMOL/L — SIGNIFICANT CHANGE UP (ref 17–32)
CREAT SERPL-MCNC: 1.5 MG/DL — SIGNIFICANT CHANGE UP (ref 0.7–1.5)
CULTURE RESULTS: SIGNIFICANT CHANGE UP
EGFR: 43 ML/MIN/1.73M2 — LOW
EOSINOPHIL # BLD AUTO: 0.42 K/UL — SIGNIFICANT CHANGE UP (ref 0–0.7)
EOSINOPHIL NFR BLD AUTO: 5.4 % — SIGNIFICANT CHANGE UP (ref 0–8)
ESTIMATED AVERAGE GLUCOSE: 137 MG/DL — HIGH (ref 68–114)
GLUCOSE BLDC GLUCOMTR-MCNC: 158 MG/DL — HIGH (ref 70–99)
GLUCOSE BLDC GLUCOMTR-MCNC: 175 MG/DL — HIGH (ref 70–99)
GLUCOSE BLDC GLUCOMTR-MCNC: 216 MG/DL — HIGH (ref 70–99)
GLUCOSE SERPL-MCNC: 160 MG/DL — HIGH (ref 70–99)
HCT VFR BLD CALC: 24.2 % — LOW (ref 42–52)
HGB BLD-MCNC: 8 G/DL — LOW (ref 14–18)
IMM GRANULOCYTES NFR BLD AUTO: 0.4 % — HIGH (ref 0.1–0.3)
IRON SATN MFR SERPL: 13 % — LOW (ref 15–50)
IRON SATN MFR SERPL: 37 UG/DL — SIGNIFICANT CHANGE UP (ref 35–150)
LYMPHOCYTES # BLD AUTO: 1.75 K/UL — SIGNIFICANT CHANGE UP (ref 1.2–3.4)
LYMPHOCYTES # BLD AUTO: 22.5 % — SIGNIFICANT CHANGE UP (ref 20.5–51.1)
MAGNESIUM SERPL-MCNC: 1.8 MG/DL — SIGNIFICANT CHANGE UP (ref 1.8–2.4)
MCHC RBC-ENTMCNC: 28.7 PG — SIGNIFICANT CHANGE UP (ref 27–31)
MCHC RBC-ENTMCNC: 33.1 G/DL — SIGNIFICANT CHANGE UP (ref 32–37)
MCV RBC AUTO: 86.7 FL — SIGNIFICANT CHANGE UP (ref 80–94)
MONOCYTES # BLD AUTO: 0.54 K/UL — SIGNIFICANT CHANGE UP (ref 0.1–0.6)
MONOCYTES NFR BLD AUTO: 6.9 % — SIGNIFICANT CHANGE UP (ref 1.7–9.3)
NEUTROPHILS # BLD AUTO: 4.99 K/UL — SIGNIFICANT CHANGE UP (ref 1.4–6.5)
NEUTROPHILS NFR BLD AUTO: 64.2 % — SIGNIFICANT CHANGE UP (ref 42.2–75.2)
NRBC # BLD: 0 /100 WBCS — SIGNIFICANT CHANGE UP (ref 0–0)
PLATELET # BLD AUTO: 256 K/UL — SIGNIFICANT CHANGE UP (ref 130–400)
PMV BLD: 10.2 FL — SIGNIFICANT CHANGE UP (ref 7.4–10.4)
POTASSIUM SERPL-MCNC: 4.1 MMOL/L — SIGNIFICANT CHANGE UP (ref 3.5–5)
POTASSIUM SERPL-SCNC: 4.1 MMOL/L — SIGNIFICANT CHANGE UP (ref 3.5–5)
PROT SERPL-MCNC: 4.5 G/DL — LOW (ref 6–8.3)
PROT SERPL-MCNC: 4.5 G/DL — LOW (ref 6–8.3)
PROT SERPL-MCNC: 4.6 G/DL — LOW (ref 6–8)
RBC # BLD: 2.79 M/UL — LOW (ref 4.7–6.1)
RBC # FLD: 17.6 % — HIGH (ref 11.5–14.5)
SODIUM SERPL-SCNC: 135 MMOL/L — SIGNIFICANT CHANGE UP (ref 135–146)
SPECIMEN SOURCE: SIGNIFICANT CHANGE UP
TIBC SERPL-MCNC: 276 UG/DL — SIGNIFICANT CHANGE UP (ref 220–430)
UIBC SERPL-MCNC: 239 UG/DL — SIGNIFICANT CHANGE UP (ref 110–370)
WBC # BLD: 7.78 K/UL — SIGNIFICANT CHANGE UP (ref 4.8–10.8)
WBC # FLD AUTO: 7.78 K/UL — SIGNIFICANT CHANGE UP (ref 4.8–10.8)

## 2023-05-05 PROCEDURE — 99223 1ST HOSP IP/OBS HIGH 75: CPT

## 2023-05-05 PROCEDURE — 99239 HOSP IP/OBS DSCHRG MGMT >30: CPT

## 2023-05-05 RX ORDER — LOSARTAN POTASSIUM 100 MG/1
1 TABLET, FILM COATED ORAL
Qty: 0 | Refills: 0 | DISCHARGE

## 2023-05-05 RX ADMIN — HEPARIN SODIUM 5000 UNIT(S): 5000 INJECTION INTRAVENOUS; SUBCUTANEOUS at 06:07

## 2023-05-05 RX ADMIN — Medication 4: at 08:22

## 2023-05-05 RX ADMIN — Medication 25 MICROGRAM(S): at 06:07

## 2023-05-05 RX ADMIN — CALCITRIOL 0.5 MICROGRAM(S): 0.5 CAPSULE ORAL at 12:54

## 2023-05-05 RX ADMIN — PANTOPRAZOLE SODIUM 40 MILLIGRAM(S): 20 TABLET, DELAYED RELEASE ORAL at 12:53

## 2023-05-05 RX ADMIN — Medication 81 MILLIGRAM(S): at 12:54

## 2023-05-05 RX ADMIN — MAGNESIUM OXIDE 400 MG ORAL TABLET 400 MILLIGRAM(S): 241.3 TABLET ORAL at 12:54

## 2023-05-05 RX ADMIN — Medication 500 MILLIGRAM(S): at 12:53

## 2023-05-05 RX ADMIN — Medication 50 MILLIGRAM(S): at 06:08

## 2023-05-05 RX ADMIN — Medication 100 MILLIGRAM(S): at 12:54

## 2023-05-05 RX ADMIN — Medication 2 GRAM(S): at 06:08

## 2023-05-05 RX ADMIN — Medication 2: at 12:08

## 2023-05-05 RX ADMIN — Medication 0.6 MILLIGRAM(S): at 12:54

## 2023-05-05 RX ADMIN — Medication 20 MILLIGRAM(S): at 06:07

## 2023-05-05 RX ADMIN — Medication 1 TABLET(S): at 12:54

## 2023-05-05 RX ADMIN — Medication 1 TABLET(S): at 06:07

## 2023-05-05 NOTE — DISCHARGE NOTE PROVIDER - NSDCCPCAREPLAN_GEN_ALL_CORE_FT
PRINCIPAL DISCHARGE DIAGNOSIS  Diagnosis: Frequent falls  Assessment and Plan of Treatment: Fall prevention includes ways to make your home and other areas safer. It also includes ways you can move more carefully to prevent a fall. Health conditions that cause changes in your blood pressure, vision, or muscle strength and coordination may increase your risk for falls. Medicines may also increase your risk for falls if they make you dizzy, weak, or sleepy.  SEEK IMMEDIATE MEDICAL ATTENTION IF: You have fallen and are unconscious, you have fallen and cannot move part of your body, or you have fallen and have pain or a headache.  FALL PREVENTION TIPS:  Stand or sit up slowly. Use assistive devices as directed. You may need to have grab bars put in your bathroom near the toilet or in the shower.   Wear shoes that fit well and have soles that . Wear shoes both inside and outside. Do not wear shoes with high heels.   Wear a personal alarm that can call 911 in an emergency.   Manage your medical conditions. Keep all appointments with your healthcare providers. Visit your eye doctor as directed.  HOME SAFETY TIPS:  Put nonslip strips on your bath or shower floor to prevent you from slipping. Use a shower seat so you do not need to stand while you shower. Sit on the toilet or a chair in your bathroom to dry yourself and put on clothing. This will prevent you from losing your balance from drying or dressing yourself while you are standing.   Keep paths clear. Remove books, shoes, and other objects from walkways and stairs. Place cords for telephones and lamps out of the way so that you do not need to walk over them. Tape them down if you cannot move them. Remove small rugs or secure it with double-sided tape to prevent you from tripping.  Install bright lights in your home. Use night lights to help light paths to the bathroom or kitchen. Always turn on the light before you start walking.   Keep items you use often on shelves within reach. Do not use a step stool to help you reach an item.   Place reflective tape on the edges of your stairs to see better.        SECONDARY DISCHARGE DIAGNOSES  Diagnosis: Anemia  Assessment and Plan of Treatment: Your anemia is likely from your multiple myeloma. We gave you 2 units of blood and it has now stabalized. Follow up with your pcp and oncologist.   SEEK IMMEDIATE MEDICAL CARE IF YOU HAVE ANY OF THE FOLLOWING SYMPTOMS: extreme weakness/chest pain/shortness of breath, black or bloody stools, vomiting blood, fainting, fever, or any signs of dehydration.       PRINCIPAL DISCHARGE DIAGNOSIS  Diagnosis: Frequent falls  Assessment and Plan of Treatment: Fall prevention includes ways to make your home and other areas safer. It also includes ways you can move more carefully to prevent a fall. Health conditions that cause changes in your blood pressure, vision, or muscle strength and coordination may increase your risk for falls. Medicines may also increase your risk for falls if they make you dizzy, weak, or sleepy.  SEEK IMMEDIATE MEDICAL ATTENTION IF: You have fallen and are unconscious, you have fallen and cannot move part of your body, or you have fallen and have pain or a headache.  FALL PREVENTION TIPS:  Stand or sit up slowly. Use assistive devices as directed. You may need to have grab bars put in your bathroom near the toilet or in the shower.   Wear shoes that fit well and have soles that . Wear shoes both inside and outside. Do not wear shoes with high heels.   Wear a personal alarm that can call 911 in an emergency.   Manage your medical conditions. Keep all appointments with your healthcare providers. Visit your eye doctor as directed.  HOME SAFETY TIPS:  Put nonslip strips on your bath or shower floor to prevent you from slipping. Use a shower seat so you do not need to stand while you shower. Sit on the toilet or a chair in your bathroom to dry yourself and put on clothing. This will prevent you from losing your balance from drying or dressing yourself while you are standing.   Keep paths clear. Remove books, shoes, and other objects from walkways and stairs. Place cords for telephones and lamps out of the way so that you do not need to walk over them. Tape them down if you cannot move them. Remove small rugs or secure it with double-sided tape to prevent you from tripping.  Install bright lights in your home. Use night lights to help light paths to the bathroom or kitchen. Always turn on the light before you start walking.   Keep items you use often on shelves within reach. Do not use a step stool to help you reach an item.   Place reflective tape on the edges of your stairs to see better.        SECONDARY DISCHARGE DIAGNOSES  Diagnosis: Anemia  Assessment and Plan of Treatment: Your anemia is likely from your multiple myeloma. We gave you 2 units of blood and it has now stabalized. Follow up with your pcp and oncologist.   SEEK IMMEDIATE MEDICAL CARE IF YOU HAVE ANY OF THE FOLLOWING SYMPTOMS: extreme weakness/chest pain/shortness of breath, black or bloody stools, vomiting blood, fainting, fever, or any signs of dehydration.      Diagnosis: Multiple myeloma without remission  Assessment and Plan of Treatment: not on any Rx given his overall poor PS  on procrit PRN  had hospice care discussion with Dr Colmenares this May   palliative care follow up

## 2023-05-05 NOTE — CONSULT NOTE ADULT - PROBLEM SELECTOR RECOMMENDATION 5
- will follow  ______________  Moris Glover MD  Palliative Medicine  [facility]   of Geriatric and Palliative Medicine  [phone]

## 2023-05-05 NOTE — CONSULT NOTE ADULT - PROBLEM SELECTOR RECOMMENDATION 9
Full consult to follow shortly. Please call [phone] with any acute concerns. - currently improved  - continue to monitor H/H

## 2023-05-05 NOTE — CONSULT NOTE ADULT - CONVERSATION DETAILS
Discussed case with patient's daughter/HCP at bedside. She states knowing multiple myeloma is advanced, but states patient doesn't know he has cancer, as family feels it would diminish his quality of life and motivation. As such, they do not want to pursue hospice at this time, but are hopeful he can go to [facility], with the goals of enhancing quality of life once he returns home. If he declines, however, they would be open to discussing home hospice.

## 2023-05-05 NOTE — CONSULT NOTE ADULT - ASSESSMENT
89M with PMH of TIA, HTN, CKD, MM, multiple MIs, DM, gout, BPPV, BPH, hypomagnesemia, and hypothyroidism, here after fall, likely due to generalized weakness. Found to have acute-on-chronic anemia, with no overt GI bleeding. Noted to have a possible gastric mass with lymphadenopathy, family decided against further workup. Noted to have a cortical irregularity on L scapula, per ortho likely old injury vs artifact. Heme/onc consulted for MM; patient is not currently on treatment given poor functional status, and family deferred hospice after d/w outpatient heme/onc Dr. Colmenares. Also noted to have elevated troponins, likely from CKD and anemia. Patient is DNR. Palliative care consulted for [facility].

## 2023-05-05 NOTE — PHYSICAL THERAPY INITIAL EVALUATION ADULT - GENERAL OBSERVATIONS, REHAB EVAL
10:40-11:15 Pt. encountered sitting in b/s chair in NAD. +Tele. Agreeable to PT. Jessica DIAZ aware.

## 2023-05-05 NOTE — PROGRESS NOTE ADULT - SUBJECTIVE AND OBJECTIVE BOX
Patient is a 89y old  Male who presents with a chief complaint of multiple mechanical falls and possible relapse of multiple myeloma (05 May 2023 11:04)    INTERVAL HPI/OVERNIGHT EVENTS: Patient was examined and seen at bedside. This morning pt is resting comfortably in bed and reports no new issues or overnight events. No complaints, feels much better. Worked with PT, walked to the bathroom. Daughter at bedside.  ROS: Denies CP, SOB, AP, new weakness  All other systems reviewed and are within normal limits.  InitialHPI:  89 yr old man with PMHx of multiple MI, TIA, HTN, CKD, MM (in remission as per pt), on ASA, DM, Gout, BPPV, BPH, Hypomagnesia , hypothyroidism, presents from home with falls. Per pt, patient has been falling due to loss of balance but denies LOC. Per daughter, patient has been experiencing multiple falls over the past few weeks, most recently two witnessed falls two days ago. Daughter states she heard the patient fall; unclear if he hit his head but denies any LOC and was able to assist him off the ground immediately after the incident. Daughter endorses patient has been increasingly weak. He is stubborn and likes to get up by himself without assistance and that is when he falls. He has been mostly in bed but wants to try and walk although he requires significant assistance. Pt denies any fevers, chest pain, dyspnea, nausea/vomiting/diarrhea, blood in stool, urinary symptoms or any other complaints.    In the ED, VS significant for 107/57, the rest were normal  Labs significant for WBC 11.27, Hg 6.1, BUN/Creat 84/1.8, Calcium -11.3, trop- 0.06, BNP- 881  Imaging- CT Scan: Cortical irregularity of the left scapula suspicious for acute fracture. Wall thickening ofthe distal stomach, increased with increased size   adjacent lymph nodes concerning for malignancy. Further evaluation   recommended.  Patient received NS bolus, 1 unit RBC's, methocarbamol, magnesium.  Admitted for weakness and suspected multiple myeloma relapse (04 May 2023 07:51)    PAST MEDICAL & SURGICAL HISTORY:  DM (diabetes mellitus)      H/O vertigo      Acute myocardial infarction      History of TIAs  left inner ear damage      Gout      Hypothyroidism  as per pt's daughter my dad had his thyroid levels drawn  within 2-3 months- the medication dose remained the same      Multiple myeloma      [place] (hard of hearing)      HTN (hypertension)      H/O carotid endarterectomy      H/O cataract extraction      History of rectal polypectomy          General: NAD, AAOx2  HEENT:  EOMI, no LAD  CV: S1 S2  Resp: decreased breath sounds at bases  GI: NT/ND/S +BS  MS: no clubbing/cyanosis/edema, + pulses b/l  Neuro: moves all extremities    MEDICATIONS  (STANDING):  allopurinol 100 milliGRAM(s) Oral daily  ascorbic acid 500 milliGRAM(s) Oral daily  aspirin enteric coated 81 milliGRAM(s) Oral daily  calcitriol   Capsule 0.5 MICROGram(s) Oral daily  colchicine 0.6 milliGRAM(s) Oral daily  dextrose 5%. 1000 milliLiter(s) (50 mL/Hr) IV Continuous <Continuous>  dextrose 5%. 1000 milliLiter(s) (100 mL/Hr) IV Continuous <Continuous>  dextrose 50% Injectable 25 Gram(s) IV Push once  dextrose 50% Injectable 12.5 Gram(s) IV Push once  dextrose 50% Injectable 25 Gram(s) IV Push once  ergocalciferol 26470 Unit(s) Oral every week  furosemide    Tablet 20 milliGRAM(s) Oral daily  glucagon  Injectable 1 milliGRAM(s) IntraMuscular once  heparin   Injectable 5000 Unit(s) SubCutaneous every 12 hours  insulin glargine Injectable (LANTUS) 35 Unit(s) SubCutaneous at bedtime  insulin lispro (ADMELOG) corrective regimen sliding scale   SubCutaneous three times a day before meals  lactobacillus acidophilus 1 Tablet(s) Oral every 12 hours  levothyroxine 25 MICROGram(s) Oral daily  magnesium oxide 400 milliGRAM(s) Oral daily  metoprolol succinate ER 50 milliGRAM(s) Oral daily  multivitamin 1 Tablet(s) Oral daily  omega-3-Acid Ethyl Esters 2 Gram(s) Oral two times a day  pantoprazole  Injectable 40 milliGRAM(s) IV Push daily  simvastatin 10 milliGRAM(s) Oral at bedtime  sodium chloride 0.9%. 1000 milliLiter(s) (50 mL/Hr) IV Continuous <Continuous>  vibegron 75 milliGRAM(s) 75 milliGRAM(s) Oral daily    MEDICATIONS  (PRN):  acetaminophen     Tablet .. 650 milliGRAM(s) Oral every 6 hours PRN Temp greater or equal to 38C (100.4F), Mild Pain (1 - 3)  dextrose Oral Gel 15 Gram(s) Oral once PRN Blood Glucose LESS THAN 70 milliGRAM(s)/deciliter  melatonin 3 milliGRAM(s) Oral at bedtime PRN Insomnia    Vital Signs Last 24 Hrs  T(C): 35.2 (05 May 2023 13:13), Max: 35.8 (04 May 2023 20:57)  T(F): 95.4 (05 May 2023 13:13), Max: 96.5 (05 May 2023 04:58)  HR: 76 (05 May 2023 13:13) (72 - 87)  BP: 116/56 (05 May 2023 13:13) (108/53 - 116/56)  BP(mean): --  RR: 20 (05 May 2023 13:13) (18 - 20)  SpO2: --      CAPILLARY BLOOD GLUCOSE      POCT Blood Glucose.: 175 mg/dL (05 May 2023 11:10)  POCT Blood Glucose.: 216 mg/dL (05 May 2023 07:35)  POCT Blood Glucose.: 287 mg/dL (04 May 2023 21:21)  POCT Blood Glucose.: 229 mg/dL (04 May 2023 17:51)  POCT Blood Glucose.: 230 mg/dL (04 May 2023 16:33)                          8.0    7.78  )-----------( 256      ( 05 May 2023 06:44 )             24.2         135  |  105  |  74<HH>  ----------------------------<  160<H>  4.1   |  20  |  1.5    Ca    10.2      05 May 2023 06:44  Phos  4.1     05-03  Mg     1.8     -    TPro  4.6<L>  /  Alb  2.6<L>  /  TBili  0.6  /  DBili  x   /  AST  28  /  ALT  26  /  AlkPhos  92  05-05    LIVER FUNCTIONS - ( 05 May 2023 06:44 )  Alb: 2.6 g/dL / Pro: 4.6 g/dL / ALK PHOS: 92 U/L / ALT: 26 U/L / AST: 28 U/L / GGT: x           CARDIAC MARKERS ( 04 May 2023 05:43 )  x     / 0.06 ng/mL / x     / x     / x      CARDIAC MARKERS ( 03 May 2023 20:44 )  x     / 0.06 ng/mL / 53 U/L / x     / x          PT/INR - ( 03 May 2023 20:44 )   PT: 12.40 sec;   INR: 1.08 ratio         PTT - ( 03 May 2023 20:44 )  PTT:29.6 sec  Urinalysis Basic - ( 04 May 2023 00:18 )    Color: Yellow / Appearance: Clear / S.017 / pH: x  Gluc: x / Ketone: Negative  / Bili: Negative / Urobili: <2 mg/dL   Blood: x / Protein: 30 mg/dL / Nitrite: Negative   Leuk Esterase: Negative / RBC: 1 /HPF / WBC 1 /HPF   Sq Epi: x / Non Sq Epi: x / Bacteria: Negative              Culture - Urine (collected 04 May 2023 00:18)  Source: Clean Catch Clean Catch (Midstream)  Final Report (05 May 2023 06:36):    <10,000 CFU/mL Normal Urogenital Gracia      Chart, Consultant(s) Notes Reviewed:  [x ] YES  [ ] NO  Care Discussed with Consultants/Other Providers/ Housestaff [ x] YES  [ ] NO  Radiology, labs, old available records personally reviewed.

## 2023-05-05 NOTE — DISCHARGE NOTE PROVIDER - NSCORESITESY/N_GEN_A_CORE_RD
No Notification Instructions: Patient will be notified of biopsy results. However, patient instructed to call the office if not contacted within 2 weeks.

## 2023-05-05 NOTE — DISCHARGE NOTE NURSING/CASE MANAGEMENT/SOCIAL WORK - NSDCPEFALRISK_GEN_ALL_CORE
For information on Fall & Injury Prevention, visit: [email or web address] OR  [email or web address] OR  https://www.cdc.gov/steadi/patient.html

## 2023-05-05 NOTE — DISCHARGE NOTE NURSING/CASE MANAGEMENT/SOCIAL WORK - PATIENT PORTAL LINK FT
You can access the FollowMyHealth Patient Portal offered by [facility] by registering at the following website: [email or web address]. By joining [facility]’s FollowMyHealth portal, you will also be able to view your health information using other applications (apps) compatible with our system.

## 2023-05-05 NOTE — CONSULT NOTE ADULT - REASON FOR ADMISSION
multiple mechanical falls and possible relapse of multiple myeloma

## 2023-05-05 NOTE — DISCHARGE NOTE PROVIDER - HOSPITAL COURSE
89 yr old man with PMHx of multiple MI, TIA, HTN, CKD, MM (in remission as per pt), on ASA, DM, Gout, BPPV, BPH, Hypomagnesia , hypothyroidism, presents from home with falls. Per pt, patient has been falling due to loss of balance but denies LOC. Per daughter, patient has been experiencing multiple falls over the past few weeks, most recently two witnessed falls two days ago. Daughter states she heard the patient fall; unclear if he hit his head but denies any LOC and was able to assist him off the ground immediately after the incident. Daughter endorses patient has been increasingly weak. He is stubborn and likes to get up by himself without assistance and that is when he falls. He has been mostly in bed but wants to try and walk although he requires significant assistance. Pt denies any fevers, chest pain, dyspnea, nausea/vomiting/diarrhea, blood in stool, urinary symptoms or any other complaints.    In the ED, VS significant for 107/57, the rest were normal  Labs significant for WBC 11.27, Hg 6.1, BUN/Creat 84/1.8, Calcium -11.3, trop- 0.06, BNP- 881  Imaging- CT Scan: Cortical irregularity of the left scapula suspicious for acute fracture. Wall thickening ofthe distal stomach, increased with increased size   adjacent lymph nodes concerning for malignancy. Further evaluation   recommended.  Patient received NS bolus, 1 unit RBC's, methocarbamol, magnesium.  Admitted for weakness and suspected multiple myeloma relapse    HC:  Pt got two units blood, hgb stable. Seen by onc, has recurrent MM, no treatment at this time. Seen by GI, family does not want intervention for possible stomach mass. 89 yr old man with PMHx of multiple MI, TIA, HTN, CKD, MM (in remission as per pt), on ASA, DM, Gout, BPPV, BPH, Hypomagnesia , hypothyroidism, presents from home with falls. Per pt, patient has been falling due to loss of balance but denies LOC. Per daughter, patient has been experiencing multiple falls over the past few weeks, most recently two witnessed falls two days ago. Daughter states she heard the patient fall; unclear if he hit his head but denies any LOC and was able to assist him off the ground immediately after the incident. Daughter endorses patient has been increasingly weak. He is stubborn and likes to get up by himself without assistance and that is when he falls. He has been mostly in bed but wants to try and walk although he requires significant assistance. Pt denies any fevers, chest pain, dyspnea, nausea/vomiting/diarrhea, blood in stool, urinary symptoms or any other complaints.    In the ED, VS significant for 107/57, the rest were normal  Labs significant for WBC 11.27, Hg 6.1, BUN/Creat 84/1.8, Calcium -11.3, trop- 0.06, BNP- 881  Imaging- CT Scan: Cortical irregularity of the left scapula suspicious for acute fracture. Wall thickening ofthe distal stomach, increased with increased size   adjacent lymph nodes concerning for malignancy. Further evaluation   recommended.  Patient received NS bolus, 2 units RBC's, methocarbamol, magnesium.  Admitted for weakness and suspected multiple myeloma relapse    HC:  Pt got two units blood, hgb stable. Seen by onc, has recurrent MM, no treatment at this time. Seen by GI, family does not want intervention for possible stomach mass.

## 2023-05-05 NOTE — SWALLOW BEDSIDE ASSESSMENT ADULT - SLP PERTINENT HISTORY OF CURRENT PROBLEM
Abdomen , soft, nontender, nondistended , no guarding or rigidity , no masses palpable , normal bowel sounds , Liver and Spleen , no hepatomegaly present , liver nontender , spleen not palpable
89 yr old man with PMHx of multiple MI, TIA, HTN, CKD, MM (in remission as per pt), on ASA, DM, Gout, BPPV, BPH, Hypomagnesia , hypothyroidism, presents from home with falls. Suspected possible relapse of multiple myeloma. CT abdomen revealed wall thickening distal stomach, suspected malignancy. CT chest: no consolidation.

## 2023-05-05 NOTE — DISCHARGE NOTE PROVIDER - PROVIDER TOKENS
PROVIDER:[TOKEN:[[id number]],FOLLOWUP:[1 week]],PROVIDER:[TOKEN:[[id number]],FOLLOWUP:[1 week]]

## 2023-05-05 NOTE — CONSULT NOTE ADULT - PROBLEM SELECTOR RECOMMENDATION 3
- per documentation, no longer a treatment candidate given performance status  - supportive care  - no pain currently  - no plans for hospice at this time, but may change if he clinically declines

## 2023-05-05 NOTE — PHYSICAL THERAPY INITIAL EVALUATION ADULT - LEVEL OF INDEPENDENCE: GAIT, REHAB EVAL
Addended by: ANNA BULLARD on: 9/3/2020 01:46 PM     Modules accepted: Orders    
minimum assist (75% patients effort)

## 2023-05-05 NOTE — CONSULT NOTE ADULT - SUBJECTIVE AND OBJECTIVE BOX
HPI: 89M with PMH of TIA, HTN, CKD, MM, multiple MIs, DM, gout, BPPV, BPH, hypomagnesemia, and hypothyroidism, here after fall, likely due to generalized weakness. Found to have acute-on-chronic anemia, with no overt GI bleeding. Noted to have a possible gastric mass with lymphadenopathy, family decided against further workup. Noted to have a cortical irregularity on L scapula, per ortho likely old injury vs artifact. Heme/onc consulted for MM; patient is not currently on treatment given poor functional status, and family deferred hospice after d/w outpatient heme/onc Dr. Colmenares. Also noted to have elevated troponins, likely from CKD and anemia. Patient is DNR. Palliative care consulted for GOC.    PERTINENT PM/SXH:   DM (diabetes mellitus)    H/O vertigo    Acute myocardial infarction    History of TIAs    Gout    Hypothyroidism    Hypothyroidism    Multiple myeloma    [place] (hard of hearing)    HTN (hypertension)      H/O carotid endarterectomy    H/O cataract extraction    History of rectal polypectomy      FAMILY HISTORY:  denies family hx of gastric or colon cancer per daughter    ITEMS NOT CHECKED ARE NOT PRESENT    SOCIAL HISTORY:   Significant other/partner[ ]  Children[X ]  Synagogue/Spirituality:  Substance hx:  [ ]   Tobacco hx:  [ ]   Alcohol hx: [ ]   Living Situation: [X ]Home  [ ]Long term care  [ ]Rehab [ ]Other  Home Services: [ ] HHA [ ] Visting RN [ ] Hospice  Occupation:  Home Opioid hx:  [ ] Y [ ] N [ ] I-Stop Reference No:    ADVANCE DIRECTIVES:    [ ] Full Code [ ] DNR  MOLST  [ ]  Living Will  [ ]   DECISION MAKER(s):  [ ] Health Care Proxy(s)  [ ] Surrogate(s)  [ ] Guardian           Name(s): Phone Number(s): emanuel Ayala     BASELINE (I)ADL(s) (prior to admission):  [place]: [ ]Total  [ ] Moderate [ ]Dependent  Palliative Performance Status Version 2:         %    http://npcrc.org/files/news/palliative_performance_scale_ppsv2.pdf    Allergies    No Known Allergies    Intolerances    MEDICATIONS  (STANDING):  allopurinol 100 milliGRAM(s) Oral daily  ascorbic acid 500 milliGRAM(s) Oral daily  aspirin enteric coated 81 milliGRAM(s) Oral daily  calcitriol   Capsule 0.5 MICROGram(s) Oral daily  colchicine 0.6 milliGRAM(s) Oral daily  dextrose 5%. 1000 milliLiter(s) (100 mL/Hr) IV Continuous <Continuous>  dextrose 5%. 1000 milliLiter(s) (50 mL/Hr) IV Continuous <Continuous>  dextrose 50% Injectable 25 Gram(s) IV Push once  dextrose 50% Injectable 12.5 Gram(s) IV Push once  dextrose 50% Injectable 25 Gram(s) IV Push once  ergocalciferol 70022 Unit(s) Oral every week  furosemide    Tablet 20 milliGRAM(s) Oral daily  glucagon  Injectable 1 milliGRAM(s) IntraMuscular once  heparin   Injectable 5000 Unit(s) SubCutaneous every 12 hours  insulin glargine Injectable (LANTUS) 35 Unit(s) SubCutaneous at bedtime  insulin lispro (ADMELOG) corrective regimen sliding scale   SubCutaneous three times a day before meals  lactobacillus acidophilus 1 Tablet(s) Oral every 12 hours  levothyroxine 25 MICROGram(s) Oral daily  magnesium oxide 400 milliGRAM(s) Oral daily  metoprolol succinate ER 50 milliGRAM(s) Oral daily  multivitamin 1 Tablet(s) Oral daily  omega-3-Acid Ethyl Esters 2 Gram(s) Oral two times a day  pantoprazole  Injectable 40 milliGRAM(s) IV Push daily  simvastatin 10 milliGRAM(s) Oral at bedtime  sodium chloride 0.9%. 1000 milliLiter(s) (50 mL/Hr) IV Continuous <Continuous>  vibegron 75 milliGRAM(s) 75 milliGRAM(s) Oral daily    MEDICATIONS  (PRN):  acetaminophen     Tablet .. 650 milliGRAM(s) Oral every 6 hours PRN Temp greater or equal to 38C (100.4F), Mild Pain (1 - 3)  dextrose Oral Gel 15 Gram(s) Oral once PRN Blood Glucose LESS THAN 70 milliGRAM(s)/deciliter  melatonin 3 milliGRAM(s) Oral at bedtime PRN Insomnia    PRESENT SYMPTOMS: [ ]Unable to obtain due to poor mentation   Source if other than patient:  [ ]Family   [ ]Team     Pain: [ ]yes [ ]no  QOL impact -   Location -                    Aggravating factors -  Quality -  Radiation -  Timing-  Severity (0-10 scale):  Minimal acceptable level (0-10 scale):     CPOT:    https://www.sccm.org/getattachment/rjl46p84-2j6z-2k7x-7q9s-2733i8218u2e/Critical-Care-Pain-Observation-Tool-(CPOT)    PAIN AD Score:   http://geriatrictoolkit.[facility]/cog/painad.pdf (press ctrl +  left click to view)    Dyspnea:                           [ ]None[ ]Mild [ ]Moderate [ ]Severe     Respiratory Distress Observation Scale (RDOS):   A score of 0 to 2 signifies little or no respiratory distress, 3 signifies mild distress, scores 4 to 6 indicate moderate distress, and scores greater than 7 signify severe distress  https://www.[facility].ca/sites/default/files/PDFS/451217-bqitrswvakl-mzwxorqq-plqqijvygqp-xhayy.pdf    Anxiety:                             [ ]None[ ]Mild [ ]Moderate [ ]Severe   Fatigue:                             [ ]None[ ]Mild [ ]Moderate [ ]Severe   Nausea:                             [ ]None[ ]Mild [ ]Moderate [ ]Severe   Loss of appetite:              [ ]None[ ]Mild [ ]Moderate [ ]Severe   Constipation:                    [ ]None[ ]Mild [ ]Moderate [ ]Severe    Other Symptoms:  [ ]All other review of systems negative     Palliative Performance Status Version 2:         %    http://npcrc.org/files/news/palliative_performance_scale_ppsv2.pdf  PHYSICAL EXAM:  Vital Signs Last 24 Hrs  T(C): 35.8 (05 May 2023 04:58), Max: 35.8 (04 May 2023 20:57)  T(F): 96.5 (05 May 2023 04:58), Max: 96.5 (05 May 2023 04:58)  HR: 87 (05 May 2023 04:58) (72 - 87)  BP: 111/53 (05 May 2023 04:58) (108/53 - 114/49)  BP(mean): --  RR: 18 (05 May 2023 04:58) (18 - 18)  SpO2: --     I&O's Summary    04 May 2023 07:01  -  05 May 2023 07:00  --------------------------------------------------------  IN: 0 mL / OUT: 200 mL / NET: -200 mL    05 May 2023 07:01  -  05 May 2023 11:04  --------------------------------------------------------  IN: 0 mL / OUT: 250 mL / NET: -250 mL        GENERAL:  [X ] No acute distress [ ]Lethargic  [ ]Unarousable  [ ]Verbal  [ ]Non-Verbal [ ]Cachexia    BEHAVIORAL/PSYCH:  [ ]Alert and Oriented x  [ ] Anxiety [ ] Delirium [ ] Agitation [X ] Calm   EYES: [ ] No scleral icterus [ ] Scleral icterus [ ] Closed  ENMT:  [ ]Dry mouth  [ ]No external oral lesions [ ] No external ear or nose lesions  CARDIOVASCULAR:  [ ]Regular [ ]Irregular [ ]Tachy [ ]Not Tachy  [ ]Levi [ ] Edema [ ] No edema  PULMONARY:  [ ]Tachypnea  [ ]Audible excessive secretions [X ] No labored breathing [ ] labored breathing  GASTROINTESTINAL: [ ]Soft  [ ]Distended  [ X]Not distended [ ]Non tender [ ]Tender  MUSCULOSKELETAL: [ ]No clubbing [ ] clubbing  [ ] No cyanosis [ ] cyanosis  NEUROLOGIC: [ ]No focal deficits  [ ]Follows commands  [ ]Does not follow commands  [ ]Cognitive impairment  [ ]Dysphagia  [ ]Dysarthria  [ ]Paresis   SKIN: [ ] Jaundiced [X ] Non-jaundiced [ ]Rash [ ]No Rash [ ] Warm [ ] Dry  MISC/LINES: [ ] ET tube [ ] Trach [ ]NGT/OGT [ ]PEG [ ]Dickey    CRITICAL CARE:  [ ] Shock Present  [ ]Septic [ ]Cardiogenic [ ]Neurologic [ ]Hypovolemic  [ ]  Vasopressors [ ]  Inotropes   [ ]Respiratory failure present [ ]Mechanical ventilation [ ]Non-invasive ventilatory support [ ]High flow  [ ]Acute  [ ]Chronic [ ]Hypoxic  [ ]Hypercarbic [ ]Other  [ ]Other organ failure     LABS: reviewed by me                        8.0    7.78  )-----------( 256      ( 05 May 2023 06:44 )             24.2   -    135  |  105  |  74<HH>  ----------------------------<  160<H>  4.1   |  20  |  1.5    Ca    10.2      05 May 2023 06:44  Phos  4.1       Mg     1.8         TPro  4.6<L>  /  Alb  2.6<L>  /  TBili  0.6  /  DBili  x   /  AST  28  /  ALT  26  /  AlkPhos  92  05-  PT/INR - ( 03 May 2023 20:44 )   PT: 12.40 sec;   INR: 1.08 ratio         PTT - ( 03 May 2023 20:44 )  PTT:29.6 sec    Urinalysis Basic - ( 04 May 2023 00:18 )    Color: Yellow / Appearance: Clear / S.017 / pH: x  Gluc: x / Ketone: Negative  / Bili: Negative / Urobili: <2 mg/dL   Blood: x / Protein: 30 mg/dL / Nitrite: Negative   Leuk Esterase: Negative / RBC: 1 /HPF / WBC 1 /HPF   Sq Epi: x / Non Sq Epi: x / Bacteria: Negative      RADIOLOGY & ADDITIONAL STUDIES: reviewed by me  CT A/P 5/3/23  Cortical irregularity of the left scapula suspicious for acute fracture.    Wall thickening of the distal stomach, increased with increased size   adjacent lymph nodes concerning for malignancy. Further evaluation   recommended.    PROTEIN CALORIE MALNUTRITION PRESENT: [ ]mild [ ]moderate [ ]severe [ ]underweight [ ]morbid obesity  https://www.andeal.org/vault/2440/web/files/ONC/Table_Clinical%20Characteristics%20to%20Document%20Malnutrition-White%20JV%20et%20al%2020.pdf    Height (cm): 167.6 (23 @ 02:00), 167.6 (22 @ 11:23)  Weight (kg): 77.1 (23 @ 19:31), 81.6 (22 @ 11:23)  BMI (kg/m2): 27.4 (23 @ 02:00), 27.4 (23 @ 19:31), 29 (22 @ 11:23)    [ ]PPSV2 < or = to 30% [ ]significant weight loss  [ ]poor nutritional intake  [ ]anasarca      [ ]Artificial Nutrition      REFERRALS:   [ ]Chaplaincy  [ ]Hospice  [ ]Child Life  [ ]Social Work  [ ]Case management [ ]Holistic Therapy     Palliative care education provided to patient and/or family    Goals of Care Document:     ______________  Moris Glover MD  Palliative Medicine  [facility]   of Geriatric and Palliative Medicine  [phone]   HPI: 89M with PMH of TIA, HTN, CKD, MM, multiple MIs, DM, gout, BPPV, BPH, hypomagnesemia, and hypothyroidism, here after fall, likely due to generalized weakness. Found to have acute-on-chronic anemia, with no overt GI bleeding. Noted to have a possible gastric mass with lymphadenopathy, family decided against further workup. Noted to have a cortical irregularity on L scapula, per ortho likely old injury vs artifact. Heme/onc consulted for MM; patient is not currently on treatment given poor functional status, and family deferred hospice after d/w outpatient heme/onc Dr. Colmenares. Also noted to have elevated troponins, likely from CKD and anemia. Patient is DNR. Palliative care consulted for GOC.    PERTINENT PM/SXH:   DM (diabetes mellitus)    H/O vertigo    Acute myocardial infarction    History of TIAs    Gout    Hypothyroidism    Hypothyroidism    Multiple myeloma    [place] (hard of hearing)    HTN (hypertension)      H/O carotid endarterectomy    H/O cataract extraction    History of rectal polypectomy      FAMILY HISTORY:  denies family hx of gastric or colon cancer per daughter    ITEMS NOT CHECKED ARE NOT PRESENT    SOCIAL HISTORY:   Significant other/partner[ ]  Children[X ]  [place]/Spirituality:  Substance hx:  [ ]   Tobacco hx:  [ ]   Alcohol hx: [ ]   Living Situation: [X ]Home  [ ]Long term care  [ ]Rehab [ ]Other  Home Services: [ ] HHA [ ] Visting RN [ ] Hospice  Occupation:  Home Opioid hx:  [ ] Y [ ] N [ ] I-Stop Reference No:    ADVANCE DIRECTIVES:    [ ] Full Code [X ] DNR  MOLST  [ X]  Living Will  [ ]   DECISION MAKER(s):  [ X] Health Care Proxy(s)  [ ] Surrogate(s)  [ ] Guardian           Name(s): Phone Number(s): emanuel Ayala     BASELINE (I)ADL(s) (prior to admission):  [place]: [ ]Total  [ ] Moderate [ ]Dependent  Palliative Performance Status Version 2:         %    http://npcrc.org/files/news/palliative_performance_scale_ppsv2.pdf    Allergies    No Known Allergies    Intolerances    MEDICATIONS  (STANDING):  allopurinol 100 milliGRAM(s) Oral daily  ascorbic acid 500 milliGRAM(s) Oral daily  aspirin enteric coated 81 milliGRAM(s) Oral daily  calcitriol   Capsule 0.5 MICROGram(s) Oral daily  colchicine 0.6 milliGRAM(s) Oral daily  dextrose 5%. 1000 milliLiter(s) (100 mL/Hr) IV Continuous <Continuous>  dextrose 5%. 1000 milliLiter(s) (50 mL/Hr) IV Continuous <Continuous>  dextrose 50% Injectable 25 Gram(s) IV Push once  dextrose 50% Injectable 12.5 Gram(s) IV Push once  dextrose 50% Injectable 25 Gram(s) IV Push once  ergocalciferol 36387 Unit(s) Oral every week  furosemide    Tablet 20 milliGRAM(s) Oral daily  glucagon  Injectable 1 milliGRAM(s) IntraMuscular once  heparin   Injectable 5000 Unit(s) SubCutaneous every 12 hours  insulin glargine Injectable (LANTUS) 35 Unit(s) SubCutaneous at bedtime  insulin lispro (ADMELOG) corrective regimen sliding scale   SubCutaneous three times a day before meals  lactobacillus acidophilus 1 Tablet(s) Oral every 12 hours  levothyroxine 25 MICROGram(s) Oral daily  magnesium oxide 400 milliGRAM(s) Oral daily  metoprolol succinate ER 50 milliGRAM(s) Oral daily  multivitamin 1 Tablet(s) Oral daily  omega-3-Acid Ethyl Esters 2 Gram(s) Oral two times a day  pantoprazole  Injectable 40 milliGRAM(s) IV Push daily  simvastatin 10 milliGRAM(s) Oral at bedtime  sodium chloride 0.9%. 1000 milliLiter(s) (50 mL/Hr) IV Continuous <Continuous>  vibegron 75 milliGRAM(s) 75 milliGRAM(s) Oral daily    MEDICATIONS  (PRN):  acetaminophen     Tablet .. 650 milliGRAM(s) Oral every 6 hours PRN Temp greater or equal to 38C (100.4F), Mild Pain (1 - 3)  dextrose Oral Gel 15 Gram(s) Oral once PRN Blood Glucose LESS THAN 70 milliGRAM(s)/deciliter  melatonin 3 milliGRAM(s) Oral at bedtime PRN Insomnia    PRESENT SYMPTOMS: [ ]Unable to obtain due to poor mentation   Source if other than patient:  [ ]Family   [ ]Team     Pain: [ ]yes [X ]no  QOL impact -   Location -                    Aggravating factors -  Quality -  Radiation -  Timing-  Severity (0-10 scale):  Minimal acceptable level (0-10 scale):     CPOT:    https://www.sccm.org/getattachment/cuf84k48-7l4a-5y2i-4e7p-6987x7589u0h/Critical-Care-Pain-Observation-Tool-(CPOT)    PAIN AD Score:   http://geriatrictoolkit.[facility]/cog/painad.pdf (press ctrl +  left click to view)    Dyspnea:                           [ ]None[ ]Mild [ ]Moderate [ ]Severe     Respiratory Distress Observation Scale (RDOS):   A score of 0 to 2 signifies little or no respiratory distress, 3 signifies mild distress, scores 4 to 6 indicate moderate distress, and scores greater than 7 signify severe distress  https://www.[facility].ca/sites/default/files/PDFS/588512-qhtnugxbznz-vocwixua-mqsukoflrpf-glcct.pdf    Anxiety:                             [ ]None[ ]Mild [ ]Moderate [ ]Severe   Fatigue:                             [ ]None[ ]Mild [ ]Moderate [ ]Severe   Nausea:                             [ ]None[ ]Mild [ ]Moderate [ ]Severe   Loss of appetite:              [ ]None[ ]Mild [ ]Moderate [ ]Severe   Constipation:                    [ ]None[ ]Mild [ ]Moderate [ ]Severe    Other Symptoms:  [X ]All other review of systems negative     Palliative Performance Status Version 2:         %    http://[facility].org/files/news/palliative_performance_scale_ppsv2.pdf  PHYSICAL EXAM:  Vital Signs Last 24 Hrs  T(C): 35.8 (05 May 2023 04:58), Max: 35.8 (04 May 2023 20:57)  T(F): 96.5 (05 May 2023 04:58), Max: 96.5 (05 May 2023 04:58)  HR: 87 (05 May 2023 04:58) (72 - 87)  BP: 111/53 (05 May 2023 04:58) (108/53 - 114/49)  BP(mean): --  RR: 18 (05 May 2023 04:58) (18 - 18)  SpO2: --     I&O's Summary    04 May 2023 07:01  -  05 May 2023 07:00  --------------------------------------------------------  IN: 0 mL / OUT: 200 mL / NET: -200 mL    05 May 2023 07:01  -  05 May 2023 11:04  --------------------------------------------------------  IN: 0 mL / OUT: 250 mL / NET: -250 mL        GENERAL:  [X ] No acute distress [ ]Lethargic  [ ]Unarousable  [ ]Verbal  [ ]Non-Verbal [ ]Cachexia    BEHAVIORAL/PSYCH:  [ ]Alert and Oriented x  [ ] Anxiety [ ] Delirium [ ] Agitation [X ] Calm   EYES: [ X] No scleral icterus [ ] Scleral icterus [ ] Closed  ENMT:  [ ]Dry mouth  [ ]No external oral lesions [X ] No external ear or nose lesions  CARDIOVASCULAR:  [ ]Regular [ ]Irregular [ ]Tachy [ ]Not Tachy  [ ]Levi [ ] Edema [ ] No edema  PULMONARY:  [ ]Tachypnea  [ ]Audible excessive secretions [X ] No labored breathing [ ] labored breathing  GASTROINTESTINAL: [ ]Soft  [ ]Distended  [ X]Not distended [ ]Non tender [ ]Tender  MUSCULOSKELETAL: [ ]No clubbing [ ] clubbing  [X ] No cyanosis [ ] cyanosis  NEUROLOGIC: [X ]No focal deficits  [ ]Follows commands  [ ]Does not follow commands  [ ]Cognitive impairment  [ ]Dysphagia  [ ]Dysarthria  [ ]Paresis   SKIN: [ ] Jaundiced [X ] Non-jaundiced [ ]Rash [ ]No Rash [ ] Warm [ ] Dry  MISC/LINES: [ ] ET tube [ ] Trach [ ]NGT/OGT [ ]PEG [ ]Dickey    CRITICAL CARE:  [ ] Shock Present  [ ]Septic [ ]Cardiogenic [ ]Neurologic [ ]Hypovolemic  [ ]  Vasopressors [ ]  Inotropes   [ ]Respiratory failure present [ ]Mechanical ventilation [ ]Non-invasive ventilatory support [ ]High flow  [ ]Acute  [ ]Chronic [ ]Hypoxic  [ ]Hypercarbic [ ]Other  [ ]Other organ failure     LABS: reviewed by me                        8.0    7.78  )-----------( 256      ( 05 May 2023 06:44 )             24.2   05-05    135  |  105  |  74<HH>  ----------------------------<  160<H>  4.1   |  20  |  1.5    Ca    10.2      05 May 2023 06:44  Phos  4.1     -  Mg     1.8         TPro  4.6<L>  /  Alb  2.6<L>  /  TBili  0.6  /  DBili  x   /  AST  28  /  ALT  26  /  AlkPhos  92  05-05  PT/INR - ( 03 May 2023 20:44 )   PT: 12.40 sec;   INR: 1.08 ratio         PTT - ( 03 May 2023 20:44 )  PTT:29.6 sec    Urinalysis Basic - ( 04 May 2023 00:18 )    Color: Yellow / Appearance: Clear / S.017 / pH: x  Gluc: x / Ketone: Negative  / Bili: Negative / Urobili: <2 mg/dL   Blood: x / Protein: 30 mg/dL / Nitrite: Negative   Leuk Esterase: Negative / RBC: 1 /HPF / WBC 1 /HPF   Sq Epi: x / Non Sq Epi: x / Bacteria: Negative      RADIOLOGY & ADDITIONAL STUDIES: reviewed by me  CT A/P 5/3/23  Cortical irregularity of the left scapula suspicious for acute fracture.    Wall thickening of the distal stomach, increased with increased size   adjacent lymph nodes concerning for malignancy. Further evaluation   recommended.    PROTEIN CALORIE MALNUTRITION PRESENT: [ ]mild [ ]moderate [ ]severe [ ]underweight [ ]morbid obesity  https://www.andeal.org/vault/2440/web/files/ONC/Table_Clinical%20Characteristics%20to%20Document%20Malnutrition-White%20JV%20et%20al%2020.pdf    Height (cm): 167.6 (23 @ 02:00), 167.6 (22 @ 11:23)  Weight (kg): 77.1 (23 @ 19:31), 81.6 (22 @ 11:23)  BMI (kg/m2): 27.4 (23 @ 02:00), 27.4 (23 @ 19:31), 29 (22 @ 11:23)    [ ]PPSV2 < or = to 30% [ ]significant weight loss  [ ]poor nutritional intake  [ ]anasarca      [ ]Artificial Nutrition      REFERRALS:   [ ]Chaplaincy  [ ]Hospice  [ ]Child Life  [ ]Social Work  [ ]Case management [ ]Holistic Therapy     Palliative care education provided to patient and/or family    Goals of Care Document:     ______________  Moris Glover MD  Palliative Medicine  [facility]   of Geriatric and Palliative Medicine  [phone]

## 2023-05-05 NOTE — PHYSICAL THERAPY INITIAL EVALUATION ADULT - PERTINENT HX OF CURRENT PROBLEM, REHAB EVAL
89 yr old man with PMHx of multiple MI, TIA, HTN, CKD, MM (in remission as per pt), on ASA, DM, Gout, BPPV, BPH, Hypomagnesia , hypothyroidism, presents from home with falls. Per pt, patient has been falling due to loss of balance but denies LOC. Per daughter, patient has been experiencing multiple falls over the past few weeks, most recently two witnessed falls two days ago. Daughter states she heard the patient fall; unclear if he hit his head but denies any LOC and was able to assist him off the ground immediately after the incident. Daughter endorses patient has been increasingly weak. He is stubborn and likes to get up by himself without assistance and that is when he falls. He has been mostly in bed but wants to try and walk although he requires significant assistance. Pt denies any fevers, chest pain, dyspnea, nausea/vomiting/diarrhea, blood in stool, urinary symptoms or any other complaints.

## 2023-05-05 NOTE — SWALLOW BEDSIDE ASSESSMENT ADULT - SWALLOW EVAL: DIAGNOSIS
Mild oral dysphagia with soft solids likely negatively impacted by edentulous status+ tolerance for soft & bite sized solids with thin liquids without overt s/s of penetration/ aspiration.

## 2023-05-05 NOTE — DISCHARGE NOTE PROVIDER - CARE PROVIDER_API CALL
Kelsy Colmenares  HEMATOLOGY  [address]  [place] [address]  Phone: [phone]  Fax: [phone]  Follow Up Time: 1 week    Baldemar Bustillo (DO)  Infectious Disease; Internal Medicine  [address] [place] [id number]  Phone: [phone]  Fax: [phone]  Follow Up Time: 1 week

## 2023-05-05 NOTE — PHYSICAL THERAPY INITIAL EVALUATION ADULT - ADDITIONAL COMMENTS
Pt. lives with daughters in private home with 1 step to enter. Per chart, daughter is patient's CDPAP; home health services through [facility] and daughter 5 days/wk for 4 hrs/day. Endorses use of RW for ambulation PTA, but states that he has not been walking much.

## 2023-05-05 NOTE — PROGRESS NOTE ADULT - ASSESSMENT
89 yr old man with PMHx of multiple MI, TIA, HTN, CKD, MM (in remission as per pt), on ASA, DM, Gout, BPPV, BPH, Hypomagnesia , hypothyroidism, presents with fall. Per pt, patient has been falling due to loss of balance but denies LOC. Per daughter, patient has been experiencing multiple falls over the past few weeks, most recently two witnessed falls two days ago. Daughter states she heard the patient fall; unclear if he hit his head but denies any LOC and was able to assist him off the ground immediately after the incident. Daughter endorses patient has been increasingly weak. Pt denies any fevers, headaches, chest pain, dyspnea, nausea/vomiting/diarrhea, blood in stool, urinary symptoms or any other complaints.    #Falls in setting of generalized weakness/anemia  - PT     #Acute on Chronic anemia - no overt GI Bleeding  Gastric wall thickening w/ upper abdominal lymphadenopathy (could also be 2/2 a component of CKD vs MM)  -GI on board : Family does not want any further workup of Gastric Mass. Palliative consulted (can f/u at [facility])  - Iron studies, haptoglobin  - keep Hg above 7.5    #Cortical irregularity of the left scapula suspicious for acute fracture on CT scan.   Per Ortho, benign clinical exam, inconsistent with an acute fracture, may represent an old injury vs artifact  Weight bearing: WBAT LUE; if pain persists, transition to NWB LUE with sling immobilizer use  XR L shoulder/scapula :Degenerative changes of the left shoulder without evidence for acute   fracture or dislocation. Questionable nondisplaced fracture through the scapular body, better   appreciated on shoulder radiographs than dedicated scapular study.    #Multiple Myeloma possible recurrence  # Hypercalcemia  - Follows Dr Colmenares as outpt  - hem/onc consult noted:  not on any Rx given his overall poor PS  on procrit PRN  had hospice care discussion with Dr Colmenares yesterday - family refused    #CKD Stage III  - Baseline 1.7-2.0  in [facility],    - at baseline   - Nephrology Dr. King    #Elevated trops likely Type II MI due to severe anemia  #Hx of MI/ CAD  - c/w aspirin, no longer on plavix   - No active chest pain, no leg edema  - EKG NSR  - Echo not on file   - trops 0.06 X2.   - Likely elevated in the setting of CKD and anemia. Less likely ACS     #HTN  - on metoprolol and losartan at home  - holding losartan as low BP's here    #DM  - On home tresiba 35-45 units and novolog 8 units prn TID  - Monitor FS. Start lantus 35 + sliding. Start TID dosing if consistently elevated FS.     #Gout  - c/w colchicine, renally dosed  - C/w allopurinol     #Hypothyroidism  - F/U TSH  - C/w synthroid     #Hx of hypomagnesia   - on home magnesium 500mg qd  - Can replete IV as needed     #BPPV  - No symptoms at this time  - Patient no longer taking meclizine    #Overactive bladder  - on gemtesa at home  - start myrbetriq here    #Misc  - GI prophylaxis: pantoprazole  - DVT prophylaxis: heparin  - Diet: soft and bite sized, dash/ carb consistent   - Dispo: SNF    DNR/DNI. Family aware of poor prognosis.    #Progress Note Handoff  Pending: Clinical improvement and stability__x___PT____x____  Pt/Family discussion: Pt/daughter informed and agrees with the current plan  Disposition: SNF    My note supersedes the residents note should a discrepancy arise.    Chart and notes personally reviewed.  Care Discussed with Consultants/Other Providers/ Housestaff [ x] YES [ ] NO   Radiology, labs, old records personally reviewed.    discussed w/ housestaff, nursing, case management, neuro team    Attestation Statements:    Attestation Statements:  Risk Statement (NON-critical care).     On this date of service, level of risk to patient is considered: High.     Due to: severe anemia, MM, hypercalcemia, troponemia    Time-based billing (NON-critical care).     50 minutes spent on total encounter. The necessity of the time spent during the encounter on this date of service was due to:     time spent on review of labs, imaging studies, old records, obtaining history, personally examining patient, counselling and communicating with patient/ family, entering orders for medications/tests/etc, discussions with other health care providers, documentation in electronic health records, independent interpretation of labs, imaging/procedure results and care coordination.

## 2023-05-05 NOTE — PHYSICAL THERAPY INITIAL EVALUATION ADULT - IMPAIRMENTS FOUND, PT EVAL
If you are a smoker, it is important for your health to stop smoking. Please be aware that second hand smoke is also harmful. gait, locomotion, and balance/gross motor/muscle strength/poor safety awareness

## 2023-05-05 NOTE — DISCHARGE NOTE PROVIDER - NSDCMRMEDTOKEN_GEN_ALL_CORE_FT
allopurinol 100 mg oral tablet: 1 tab(s) orally once a day  aspirin 81 mg oral delayed release tablet: 1 tab(s) orally once a day  bifidobacterium-lactobacillus oral capsule: 1 cap(s) orally once a day   calcitriol 0.5 mcg oral capsule: 1 cap(s) orally once a day  colchicine 0.6 mg oral tablet: 1 tab(s) orally once a day  famotidine 40 mg oral tablet: 1 tab(s) orally once a day (at bedtime)  Fish Oil 1000 mg oral capsule: 1 cap(s) orally once a day  furosemide 20 mg oral tablet: 1 tab(s) orally once a day. Take an extra tab if weight gain of 3lb or more in 1 day  levothyroxine 25 mcg (0.025 mg) oral capsule: 1 cap(s) orally once a day  Lipotropic with Multivitamins oral tablet: 1 tab(s) orally once a day  metoprolol succinate 50 mg oral tablet, extended release: 1 tab(s) orally once a day  NovoLOG 100 units/mL injectable solution: injectable 3 times a day  pantoprazole 20 mg oral delayed release tablet: 1 tab(s) orally once a day   Probiotic Formula oral capsule: 1 cap(s) orally once a day  simvastatin 10 mg oral tablet: 1 tab(s) orally once a day (at bedtime)  Tresiba 100 units/mL subcutaneous solution: subcutaneous once a day (at bedtime)  Vitamin C 500 mg oral capsule: 1 cap(s) orally once a day  Vitamin D2 1.25 mg (50,000 intl units) oral capsule: 1 cap(s) orally once a week

## 2023-05-06 LAB
FERRITIN SERPL-MCNC: 72 NG/ML — SIGNIFICANT CHANGE UP (ref 30–400)
HAPTOGLOB SERPL-MCNC: 198 MG/DL — SIGNIFICANT CHANGE UP (ref 34–200)

## 2023-05-16 DIAGNOSIS — M10.9 GOUT, UNSPECIFIED: ICD-10-CM

## 2023-05-16 DIAGNOSIS — E83.52 HYPERCALCEMIA: ICD-10-CM

## 2023-05-16 DIAGNOSIS — Z98.49 CATARACT EXTRACTION STATUS, UNSPECIFIED EYE: ICD-10-CM

## 2023-05-16 DIAGNOSIS — Z91.81 HISTORY OF FALLING: ICD-10-CM

## 2023-05-16 DIAGNOSIS — C90.00 MULTIPLE MYELOMA NOT HAVING ACHIEVED REMISSION: ICD-10-CM

## 2023-05-16 DIAGNOSIS — E03.9 HYPOTHYROIDISM, UNSPECIFIED: ICD-10-CM

## 2023-05-16 DIAGNOSIS — Z79.890 HORMONE REPLACEMENT THERAPY: ICD-10-CM

## 2023-05-16 DIAGNOSIS — E11.22 TYPE 2 DIABETES MELLITUS WITH DIABETIC CHRONIC KIDNEY DISEASE: ICD-10-CM

## 2023-05-16 DIAGNOSIS — Z79.4 LONG TERM (CURRENT) USE OF INSULIN: ICD-10-CM

## 2023-05-16 DIAGNOSIS — N18.30 CHRONIC KIDNEY DISEASE, STAGE 3 UNSPECIFIED: ICD-10-CM

## 2023-05-16 DIAGNOSIS — I25.2 OLD MYOCARDIAL INFARCTION: ICD-10-CM

## 2023-05-16 DIAGNOSIS — R59.0 LOCALIZED ENLARGED LYMPH NODES: ICD-10-CM

## 2023-05-16 DIAGNOSIS — I21.A1 MYOCARDIAL INFARCTION TYPE 2: ICD-10-CM

## 2023-05-16 DIAGNOSIS — R35.0 FREQUENCY OF MICTURITION: ICD-10-CM

## 2023-05-16 DIAGNOSIS — K31.9 DISEASE OF STOMACH AND DUODENUM, UNSPECIFIED: ICD-10-CM

## 2023-05-16 DIAGNOSIS — Z86.73 PERSONAL HISTORY OF TRANSIENT ISCHEMIC ATTACK (TIA), AND CEREBRAL INFARCTION WITHOUT RESIDUAL DEFICITS: ICD-10-CM

## 2023-05-16 DIAGNOSIS — Z87.891 PERSONAL HISTORY OF NICOTINE DEPENDENCE: ICD-10-CM

## 2023-05-16 DIAGNOSIS — Z66 DO NOT RESUSCITATE: ICD-10-CM

## 2023-05-16 DIAGNOSIS — I12.9 HYPERTENSIVE CHRONIC KIDNEY DISEASE WITH STAGE 1 THROUGH STAGE 4 CHRONIC KIDNEY DISEASE, OR UNSPECIFIED CHRONIC KIDNEY DISEASE: ICD-10-CM

## 2023-05-16 DIAGNOSIS — N40.1 BENIGN PROSTATIC HYPERPLASIA WITH LOWER URINARY TRACT SYMPTOMS: ICD-10-CM

## 2023-05-16 DIAGNOSIS — Z79.82 LONG TERM (CURRENT) USE OF ASPIRIN: ICD-10-CM

## 2023-05-16 DIAGNOSIS — E83.42 HYPOMAGNESEMIA: ICD-10-CM

## 2023-05-16 DIAGNOSIS — D63.0 ANEMIA IN NEOPLASTIC DISEASE: ICD-10-CM

## 2023-05-16 NOTE — PHYSICAL THERAPY INITIAL EVALUATION ADULT - PATIENT/FAMILY/SIGNIFICANT OTHER GOALS STATEMENT, PT EVAL
Previous Accession (Optional): [id number] Previous Accession (Optional): [id number] To walk to bathroom.

## 2023-05-17 LAB
% ALBUMIN: 48.3 % — SIGNIFICANT CHANGE UP
% ALPHA 1: 10 % — SIGNIFICANT CHANGE UP
% ALPHA 2: 15.4 % — SIGNIFICANT CHANGE UP
% BETA: 16.2 % — SIGNIFICANT CHANGE UP
% GAMMA: 10.1 % — SIGNIFICANT CHANGE UP
% M SPIKE: 4.7 % — SIGNIFICANT CHANGE UP
ALBUMIN SERPL ELPH-MCNC: 2.2 G/DL — LOW (ref 3.6–5.5)
ALBUMIN/GLOB SERPL ELPH: 1 RATIO — SIGNIFICANT CHANGE UP
ALPHA1 GLOB SERPL ELPH-MCNC: 0.4 G/DL — SIGNIFICANT CHANGE UP (ref 0.1–0.4)
ALPHA2 GLOB SERPL ELPH-MCNC: 0.7 G/DL — SIGNIFICANT CHANGE UP (ref 0.5–1)
B-GLOBULIN SERPL ELPH-MCNC: 0.7 G/DL — SIGNIFICANT CHANGE UP (ref 0.5–1)
GAMMA GLOBULIN: 0.5 G/DL — LOW (ref 0.6–1.6)
INTERPRETATION SERPL IFE-IMP: SIGNIFICANT CHANGE UP
M-SPIKE: 0.2 G/DL — HIGH (ref 0–0)
PROT PATTERN SERPL ELPH-IMP: SIGNIFICANT CHANGE UP

## 2024-02-12 NOTE — H&P PST ADULT - CIGARETTE, PACK YRS
25

## 2024-10-23 NOTE — ED PROVIDER NOTE - IV ALTEPLASE EXCL ABS HIDDEN
Patient has questions regarding prescriptions.  Please call.  Thank you.  
ciprofloxacin 500 MG Oral Tab 20 tablet 0 10/23/2024 --    Sig - Route: Take 1 tablet (500 mg total) by mouth 2 (two) times daily. - Oral    Sent to pharmacy as: Ciprofloxacin HCl 500 MG Oral Tablet (Cipro)    E-Prescribing Status: Receipt confirmed by pharmacy (10/23/2024 10:13 AM CDT)        Spoke to patient informed of prescriptions.   
show

## 2025-04-22 NOTE — PHYSICAL EXAM
Anesthesia Post-op Note    Patient: Migue Encarnacion  Procedure(s) Performed: EGD with biopsy  Anesthesia type: MAC    Vitals Value Taken Time   Temp 98.0 F 04/22/25 0824   Pulse 74 04/22/25 0824   Resp 17 04/22/25 0824   SpO2 99% 04/22/25 0824   /71 04/22/25 0824         Patient Location: bedside  Post-op Vital Signs:stable  Level of Consciousness: awake and alert  Respiratory Status: spontaneous ventilation and room air  Cardiovascular stable  Hydration: euvolemic  Pain Management: adequately controlled  Handoff: Handoff to receiving clinician was performed and questions were answered  Vomiting: none  Nausea: None  Airway Patency:patent  Post-op Assessment: awake, alert, appropriately conversant, or baseline, no complications, patient tolerated procedure well, no evidence of recall, dentition, mouth, tongue, and oropharynx unchanged  and No Corneal Abrasion      No notable events documented.                       [FreeTextEntry1] : \par Mental status: Awake, alert and oriented x3. Recent and remote memory intact. Naming, repetition and comprehension intact. Attention/concentration intact. No dysarthria, no aphasia. Fund of knowledge appropriate. \par Cranial nerves: Pupils equally round and reactive to light, visual fields full, no nystagmus, extraocular muscles intact, V1 through V3 intact bilaterally and symmetric, face symmetric, hearing intact to finger rub, palate elevation symmetric, tongue was midline. \par Motor: MRC grading 5/5 b/l UE/LE.  strength 5/5. Normal tone and bulk. No abnormal movements. \par Sensation: Intact to light touch, proprioception, and pinprick. \par Coordination: No dysmetria on finger-to-nose and heel-to-shin. No dysdiadokinesia.\par Reflexes: 2+ in bilateral UE/LE, downgoing toes bilaterally. (-) Vazquez.\par Gait: Narrow and steady. No ataxia. Romberg negative\par \par